# Patient Record
Sex: MALE | Race: WHITE | NOT HISPANIC OR LATINO | Employment: UNEMPLOYED | URBAN - METROPOLITAN AREA
[De-identification: names, ages, dates, MRNs, and addresses within clinical notes are randomized per-mention and may not be internally consistent; named-entity substitution may affect disease eponyms.]

---

## 2023-09-06 ENCOUNTER — OFFICE VISIT (OUTPATIENT)
Dept: FAMILY MEDICINE | Facility: CLINIC | Age: 33
End: 2023-09-06
Payer: COMMERCIAL

## 2023-09-06 VITALS
BODY MASS INDEX: 27.57 KG/M2 | DIASTOLIC BLOOD PRESSURE: 78 MMHG | RESPIRATION RATE: 18 BRPM | HEART RATE: 108 BPM | TEMPERATURE: 98 F | SYSTOLIC BLOOD PRESSURE: 126 MMHG | WEIGHT: 192.56 LBS | OXYGEN SATURATION: 96 % | HEIGHT: 70 IN

## 2023-09-06 DIAGNOSIS — K42.9 UMBILICAL HERNIA WITHOUT OBSTRUCTION AND WITHOUT GANGRENE: Primary | ICD-10-CM

## 2023-09-06 PROCEDURE — 1159F MED LIST DOCD IN RCRD: CPT | Mod: CPTII,S$GLB,, | Performed by: INTERNAL MEDICINE

## 2023-09-06 PROCEDURE — 99999 PR PBB SHADOW E&M-EST. PATIENT-LVL IV: ICD-10-PCS | Mod: PBBFAC,,, | Performed by: INTERNAL MEDICINE

## 2023-09-06 PROCEDURE — 99202 OFFICE O/P NEW SF 15 MIN: CPT | Mod: S$GLB,,, | Performed by: INTERNAL MEDICINE

## 2023-09-06 PROCEDURE — 1160F RVW MEDS BY RX/DR IN RCRD: CPT | Mod: CPTII,S$GLB,, | Performed by: INTERNAL MEDICINE

## 2023-09-06 PROCEDURE — 99202 PR OFFICE/OUTPT VISIT, NEW, LEVL II, 15-29 MIN: ICD-10-PCS | Mod: S$GLB,,, | Performed by: INTERNAL MEDICINE

## 2023-09-06 PROCEDURE — 3078F PR MOST RECENT DIASTOLIC BLOOD PRESSURE < 80 MM HG: ICD-10-PCS | Mod: CPTII,S$GLB,, | Performed by: INTERNAL MEDICINE

## 2023-09-06 PROCEDURE — 3078F DIAST BP <80 MM HG: CPT | Mod: CPTII,S$GLB,, | Performed by: INTERNAL MEDICINE

## 2023-09-06 PROCEDURE — 99999 PR PBB SHADOW E&M-EST. PATIENT-LVL IV: CPT | Mod: PBBFAC,,, | Performed by: INTERNAL MEDICINE

## 2023-09-06 PROCEDURE — 1160F PR REVIEW ALL MEDS BY PRESCRIBER/CLIN PHARMACIST DOCUMENTED: ICD-10-PCS | Mod: CPTII,S$GLB,, | Performed by: INTERNAL MEDICINE

## 2023-09-06 PROCEDURE — 3074F PR MOST RECENT SYSTOLIC BLOOD PRESSURE < 130 MM HG: ICD-10-PCS | Mod: CPTII,S$GLB,, | Performed by: INTERNAL MEDICINE

## 2023-09-06 PROCEDURE — 3074F SYST BP LT 130 MM HG: CPT | Mod: CPTII,S$GLB,, | Performed by: INTERNAL MEDICINE

## 2023-09-06 PROCEDURE — 3008F PR BODY MASS INDEX (BMI) DOCUMENTED: ICD-10-PCS | Mod: CPTII,S$GLB,, | Performed by: INTERNAL MEDICINE

## 2023-09-06 PROCEDURE — 1159F PR MEDICATION LIST DOCUMENTED IN MEDICAL RECORD: ICD-10-PCS | Mod: CPTII,S$GLB,, | Performed by: INTERNAL MEDICINE

## 2023-09-06 PROCEDURE — 3008F BODY MASS INDEX DOCD: CPT | Mod: CPTII,S$GLB,, | Performed by: INTERNAL MEDICINE

## 2023-09-06 RX ORDER — HYDROGEN PEROXIDE 3 %
20 SOLUTION, NON-ORAL MISCELLANEOUS
COMMUNITY

## 2023-09-06 NOTE — PROGRESS NOTES
Subjective     Patient ID: Vladislav Shahid is a 32 y.o. male.    Chief Complaint: Follow-up and Hernia      HPI  Patient presents with complaints of a bump on abdomen. He reports last week he felt a pain just above the umbilicus. On Saturday, he noticed a bump. He reduced the bump and had some pain that resolved. He has had no pain since.    Review of Systems   Constitutional:  Negative for chills and fatigue.   Respiratory:  Negative for chest tightness and shortness of breath.    Cardiovascular:  Negative for chest pain and palpitations.   Gastrointestinal:  Negative for abdominal pain, constipation, diarrhea, nausea and vomiting.        Bump above umbilicus   Genitourinary:  Negative for frequency and urgency.   Neurological:  Negative for dizziness, numbness and headaches.          Objective     Physical Exam  Constitutional:       General: He is not in acute distress.     Appearance: Normal appearance. He is normal weight.   HENT:      Head: Normocephalic and atraumatic.   Cardiovascular:      Rate and Rhythm: Normal rate and regular rhythm.      Heart sounds: Normal heart sounds. No murmur heard.     No friction rub. No gallop.   Pulmonary:      Effort: Pulmonary effort is normal.      Breath sounds: Normal breath sounds. No wheezing, rhonchi or rales.   Abdominal:      General: Bowel sounds are normal. There is no distension.      Palpations: Abdomen is soft.      Tenderness: There is no abdominal tenderness. There is no rebound.      Comments: Hard tissue above umbilicus in midline, no palpable wall defect   Skin:     General: Skin is warm and dry.      Coloration: Skin is not jaundiced.   Neurological:      General: No focal deficit present.      Mental Status: He is alert and oriented to person, place, and time. Mental status is at baseline.   Psychiatric:         Mood and Affect: Mood normal.         Behavior: Behavior normal.            Assessment and Plan     1. Umbilical hernia without obstruction and  without gangrene  Comments:  Will obtain ultrasound. Advised patient to avoid activities that increase abdominal pressure  Orders:  -     US Abdomen Complete; Future; Expected date: 09/06/2023               Follow up in about 6 months (around 3/6/2024) for physical exam.

## 2023-09-15 ENCOUNTER — HOSPITAL ENCOUNTER (OUTPATIENT)
Dept: RADIOLOGY | Facility: HOSPITAL | Age: 33
Discharge: HOME | End: 2023-09-15
Attending: INTERNAL MEDICINE
Payer: COMMERCIAL

## 2023-09-15 DIAGNOSIS — K42.9 UMBILICAL HERNIA WITHOUT OBSTRUCTION AND WITHOUT GANGRENE: ICD-10-CM

## 2023-09-15 PROCEDURE — 76705 ECHO EXAM OF ABDOMEN: CPT | Mod: TC

## 2023-09-15 PROCEDURE — 76705 US ABDOMEN LIMITED_HERNIA: ICD-10-PCS | Mod: 26,,, | Performed by: RADIOLOGY

## 2023-09-15 PROCEDURE — 76705 ECHO EXAM OF ABDOMEN: CPT | Mod: 26,,, | Performed by: RADIOLOGY

## 2024-09-25 ENCOUNTER — OFFICE VISIT (OUTPATIENT)
Dept: FAMILY MEDICINE | Facility: CLINIC | Age: 34
End: 2024-09-25
Payer: COMMERCIAL

## 2024-09-25 ENCOUNTER — LAB VISIT (OUTPATIENT)
Dept: LAB | Facility: HOSPITAL | Age: 34
End: 2024-09-25
Attending: INTERNAL MEDICINE
Payer: COMMERCIAL

## 2024-09-25 VITALS
BODY MASS INDEX: 27.25 KG/M2 | HEIGHT: 70 IN | TEMPERATURE: 99 F | OXYGEN SATURATION: 95 % | SYSTOLIC BLOOD PRESSURE: 122 MMHG | HEART RATE: 93 BPM | DIASTOLIC BLOOD PRESSURE: 76 MMHG | WEIGHT: 190.38 LBS | RESPIRATION RATE: 17 BRPM

## 2024-09-25 DIAGNOSIS — K21.9 GASTROESOPHAGEAL REFLUX DISEASE WITHOUT ESOPHAGITIS: ICD-10-CM

## 2024-09-25 DIAGNOSIS — Z00.00 ANNUAL PHYSICAL EXAM: ICD-10-CM

## 2024-09-25 DIAGNOSIS — K42.9 UMBILICAL HERNIA WITHOUT OBSTRUCTION AND WITHOUT GANGRENE: ICD-10-CM

## 2024-09-25 DIAGNOSIS — Z00.00 ANNUAL PHYSICAL EXAM: Primary | ICD-10-CM

## 2024-09-25 LAB
ALBUMIN SERPL BCP-MCNC: 4.4 G/DL (ref 3.5–5.2)
ALP SERPL-CCNC: 72 U/L (ref 55–135)
ALT SERPL W/O P-5'-P-CCNC: 41 U/L (ref 10–44)
ANION GAP SERPL CALC-SCNC: 10 MMOL/L (ref 8–16)
AST SERPL-CCNC: 24 U/L (ref 10–40)
BASOPHILS # BLD AUTO: 0.07 K/UL (ref 0–0.2)
BASOPHILS NFR BLD: 0.7 % (ref 0–1.9)
BILIRUB SERPL-MCNC: 0.3 MG/DL (ref 0.1–1)
BUN SERPL-MCNC: 11 MG/DL (ref 6–20)
CALCIUM SERPL-MCNC: 9.9 MG/DL (ref 8.7–10.5)
CHLORIDE SERPL-SCNC: 105 MMOL/L (ref 95–110)
CHOLEST SERPL-MCNC: 190 MG/DL (ref 120–199)
CHOLEST/HDLC SERPL: 3.7 {RATIO} (ref 2–5)
CO2 SERPL-SCNC: 25 MMOL/L (ref 23–29)
CREAT SERPL-MCNC: 1.2 MG/DL (ref 0.5–1.4)
DIFFERENTIAL METHOD BLD: ABNORMAL
EOSINOPHIL # BLD AUTO: 0.1 K/UL (ref 0–0.5)
EOSINOPHIL NFR BLD: 1.3 % (ref 0–8)
ERYTHROCYTE [DISTWIDTH] IN BLOOD BY AUTOMATED COUNT: 12.9 % (ref 11.5–14.5)
EST. GFR  (NO RACE VARIABLE): >60 ML/MIN/1.73 M^2
ESTIMATED AVG GLUCOSE: 94 MG/DL (ref 68–131)
GLUCOSE SERPL-MCNC: 88 MG/DL (ref 70–110)
HBA1C MFR BLD: 4.9 % (ref 4–5.6)
HCT VFR BLD AUTO: 50.3 % (ref 40–54)
HDLC SERPL-MCNC: 51 MG/DL (ref 40–75)
HDLC SERPL: 26.8 % (ref 20–50)
HGB BLD-MCNC: 17 G/DL (ref 14–18)
IMM GRANULOCYTES # BLD AUTO: 0.09 K/UL (ref 0–0.04)
IMM GRANULOCYTES NFR BLD AUTO: 0.9 % (ref 0–0.5)
LDLC SERPL CALC-MCNC: 119.2 MG/DL (ref 63–159)
LYMPHOCYTES # BLD AUTO: 3.2 K/UL (ref 1–4.8)
LYMPHOCYTES NFR BLD: 31.6 % (ref 18–48)
MCH RBC QN AUTO: 29.8 PG (ref 27–31)
MCHC RBC AUTO-ENTMCNC: 33.8 G/DL (ref 32–36)
MCV RBC AUTO: 88 FL (ref 82–98)
MONOCYTES # BLD AUTO: 0.9 K/UL (ref 0.3–1)
MONOCYTES NFR BLD: 9.4 % (ref 4–15)
NEUTROPHILS # BLD AUTO: 5.6 K/UL (ref 1.8–7.7)
NEUTROPHILS NFR BLD: 56.1 % (ref 38–73)
NONHDLC SERPL-MCNC: 139 MG/DL
NRBC BLD-RTO: 0 /100 WBC
PLATELET # BLD AUTO: 306 K/UL (ref 150–450)
PMV BLD AUTO: 10.5 FL (ref 9.2–12.9)
POTASSIUM SERPL-SCNC: 4 MMOL/L (ref 3.5–5.1)
PROT SERPL-MCNC: 7.6 G/DL (ref 6–8.4)
RBC # BLD AUTO: 5.7 M/UL (ref 4.6–6.2)
SODIUM SERPL-SCNC: 140 MMOL/L (ref 136–145)
TRIGL SERPL-MCNC: 99 MG/DL (ref 30–150)
WBC # BLD AUTO: 10.03 K/UL (ref 3.9–12.7)

## 2024-09-25 PROCEDURE — 3078F DIAST BP <80 MM HG: CPT | Mod: CPTII,S$GLB,, | Performed by: INTERNAL MEDICINE

## 2024-09-25 PROCEDURE — 80061 LIPID PANEL: CPT | Performed by: INTERNAL MEDICINE

## 2024-09-25 PROCEDURE — 83036 HEMOGLOBIN GLYCOSYLATED A1C: CPT | Performed by: INTERNAL MEDICINE

## 2024-09-25 PROCEDURE — 99395 PREV VISIT EST AGE 18-39: CPT | Mod: S$GLB,,, | Performed by: INTERNAL MEDICINE

## 2024-09-25 PROCEDURE — 84443 ASSAY THYROID STIM HORMONE: CPT | Performed by: INTERNAL MEDICINE

## 2024-09-25 PROCEDURE — 80053 COMPREHEN METABOLIC PANEL: CPT | Performed by: INTERNAL MEDICINE

## 2024-09-25 PROCEDURE — 3008F BODY MASS INDEX DOCD: CPT | Mod: CPTII,S$GLB,, | Performed by: INTERNAL MEDICINE

## 2024-09-25 PROCEDURE — 36415 COLL VENOUS BLD VENIPUNCTURE: CPT | Mod: PO | Performed by: INTERNAL MEDICINE

## 2024-09-25 PROCEDURE — 85025 COMPLETE CBC W/AUTO DIFF WBC: CPT | Performed by: INTERNAL MEDICINE

## 2024-09-25 PROCEDURE — 99999 PR PBB SHADOW E&M-EST. PATIENT-LVL III: CPT | Mod: PBBFAC,,, | Performed by: INTERNAL MEDICINE

## 2024-09-25 PROCEDURE — 3074F SYST BP LT 130 MM HG: CPT | Mod: CPTII,S$GLB,, | Performed by: INTERNAL MEDICINE

## 2024-09-25 PROCEDURE — 1159F MED LIST DOCD IN RCRD: CPT | Mod: CPTII,S$GLB,, | Performed by: INTERNAL MEDICINE

## 2024-09-25 NOTE — PROGRESS NOTES
Patient ID: Vladislav Shahid is a 33 y.o. male.    Chief Complaint: Cough (A week or so which possibly caused hernia ), Hernia (Hurts when coughing ), and Annual Exam      History of Present Illness    - The patient presents for an annual wellness visit and to discuss a hernia and persistent dry cough.    Mr. Shahid reports having a hernia that he would like to have surgically repaired at the beginning of next year. He is seeking information about the process and steps required for this procedure.    The patient describes a persistent dry cough that has been present for years. He feels a sensation of excess mucus or a dry area in his throat, causing a constant need to clear his throat. He previously sought medical attention for this issue, and it was suggested that it might be due to nasal drip.    The patient was previously taking Nexium for this condition but discontinued use 2 months ago. He did not notice a difference in symptoms while taking the medication. The patient is considering restarting the medication to see if it helps with the cough.      ROS:  General: denies fever, denies chills, denies fatigue, denies weight gain, denies weight loss  Eyes: denies vision changes, denies redness, denies discharge  ENT: denies ear pain, denies nasal congestion, denies sore throat  Cardiovascular: denies chest pain, denies palpitations, denies lower extremity edema  Respiratory: complains of cough, denies shortness of breath  Gastrointestinal: denies abdominal pain, denies nausea, denies vomiting, denies diarrhea, denies constipation, denies blood in stool  Genitourinary: denies dysuria, denies hematuria, denies frequency  Musculoskeletal: denies joint pain, denies muscle pain  Skin: denies rash, denies lesion  Neurological: denies headache, denies dizziness, denies numbness, denies tingling  Psychiatric: denies anxiety, denies depression, denies sleep difficulty            Physical Exam    General: In no acute  "distress.  Head: Normocephalic. Non traumatic.  Eyes: PERRLA. EOMs full. Conjunctivae clear. Fundi grossly normal.  Ears: EACs clear. TMs normal.  Nose: Mucosa pink. Mucosa moist. No obstruction.  Throat: Clear. No exudates. No lesions.  Neck: Supple. No masses. No thyromegaly. No bruits.  Chest: Lungs clear. No rales. No rhonchi. No wheezes.  Heart: RRR. No murmurs. No rubs. No gallops.  Abdomen: Soft. No tenderness. No masses. BS normal.  : Normal external genitalia. No lesions. No discharge. No hernias  noted.  Back: Normal curvature. No scoliosis. No tenderness.  Extremities: Warm. Well perfused. No upper extremity edema. No lower extremity edema. FROM. No deformities. No joint erythema.  Neuro: No focal deficits appreciated. Good muscle tone. Normal response to visual stimuli. Normal response to auditory stimuli.  Skin: Normal. No rashes. No lesions noted.            Current Outpatient Medications:     esomeprazole (NEXIUM) 20 MG capsule, Take 20 mg by mouth., Disp: , Rfl:             VITAL SIGNS:  Vitals:    09/25/24 1552   BP: 122/76   BP Location: Left arm   Patient Position: Sitting   BP Method: Medium (Manual)   Pulse: 93   Resp: 17   Temp: 98.9 °F (37.2 °C)   TempSrc: Temporal   SpO2: 95%   Weight: 86.4 kg (190 lb 5.9 oz)   Height: 5' 10" (1.778 m)       CURRENT BMI:   Body mass index is 27.31 kg/m².    LABS REVIEWED:    CBC:  No results found for: "WBC", "RBC", "HGB", "HCT", "MCV", "MCH", "MCHC", "RDW", "PLT", "MPV", "GRAN", "LYMPH", "MONO", "EOS", "BASO", "EOSINOPHIL", "BASOPHIL"    CHEMISTRY:  No results found for: "NA", "K", "CL", "CO2", "GLU", "BUN", "CREATININE", "CALCIUM", "PROT", "ALBUMIN", "BILITOT", "ALKPHOS", "AST", "ALT", "ANIONGAP", "EGFRNORACEVR"    LIPID PANEL:  No results found for: "CHOL"  No results found for: "TRIG"  No results found for: "HDL"  No results found for: "LDLCALC"    THYROID:  No results found for: "TSH", "L1ERVZP", "T9WQMDR", "THYROIDAB", "FREET4"    DIABETES:  No " "results found for: "LABA1C", "HGBA1C"  No results found for: "MICALBCREAT"    Assessment and Plan     Assessment & Plan    CHRONIC COUGH AND THROAT IRRITATION:  - Considered acid reflux as potential cause for patient's chronic dry cough and throat irritation.  - Recommend trial of PPI therapy 2 times daily to fully address potential acid reflux and evaluate its effectiveness in resolving symptoms.  - Explained that acid reflux is a common cause of chronic cough and throat irritation.  - Discussed that PPIs (proton pump inhibitors) like Nexium and Prilosec work similarly to reduce stomach acid.  - Restarted PPI (Nexium or Prilosec) 2 times daily for potential acid reflux.    LABS:  - Ordered labs for annual physical.    HERNIA:  - Referred patient to general surgery for hernia consultation.          1. Annual physical exam  Comments:  Will obtain CBC, CMP, TSH, A1c and lipid panel for regular health monitoring  Orders:  -     CBC Auto Differential; Future; Expected date: 09/25/2024  -     Comprehensive Metabolic Panel; Future; Expected date: 09/25/2024  -     Lipid Panel; Future; Expected date: 09/25/2024  -     Hemoglobin A1C; Future; Expected date: 09/25/2024  -     TSH; Future; Expected date: 09/25/2024    2. Umbilical hernia without obstruction and without gangrene  Comments:  Will refer to general surgery  Orders:  -     Ambulatory referral/consult to General Surgery; Future; Expected date: 10/02/2024    3. Gastroesophageal reflux disease without esophagitis  Comments:  Recommend taking Nexium twice daily           No follow-ups on file.      This note was generated with the assistance of ambient listening technology. Verbal consent was obtained by the patient and accompanying visitor(s) for the recording of patient appointment to facilitate this note. I attest to having reviewed and edited the generated note for accuracy, though some syntax or spelling errors may persist. Please contact the author of this note " for any clarification.

## 2024-09-26 LAB — TSH SERPL DL<=0.005 MIU/L-ACNC: 1.6 UIU/ML (ref 0.4–4)

## 2024-09-27 ENCOUNTER — TELEPHONE (OUTPATIENT)
Dept: SURGERY | Facility: CLINIC | Age: 34
End: 2024-09-27
Payer: COMMERCIAL

## 2024-09-27 NOTE — TELEPHONE ENCOUNTER
Contacting pt back to schedule appt. We scheduled him for 9/30/24 at 2:45pm.          ----- Message from Abe Gaitan sent at 9/26/2024  5:07 PM CDT -----  Type:  Appointment Request         Name of Caller:pt  When is the first available appointment?No access  Symptoms:Umbilical hernia without obstruction and without gangrene [K42.9]  Would the patient rather a call back or a response via [facility]? call  Best Call Back Number:[phone]

## 2024-09-30 ENCOUNTER — OFFICE VISIT (OUTPATIENT)
Dept: SURGERY | Facility: CLINIC | Age: 34
End: 2024-09-30
Payer: COMMERCIAL

## 2024-09-30 VITALS
HEART RATE: 72 BPM | BODY MASS INDEX: 27.52 KG/M2 | SYSTOLIC BLOOD PRESSURE: 141 MMHG | WEIGHT: 191.81 LBS | DIASTOLIC BLOOD PRESSURE: 88 MMHG

## 2024-09-30 DIAGNOSIS — K42.9 UMBILICAL HERNIA WITHOUT OBSTRUCTION AND WITHOUT GANGRENE: Primary | ICD-10-CM

## 2024-09-30 PROCEDURE — 3079F DIAST BP 80-89 MM HG: CPT | Mod: CPTII,S$GLB,,

## 2024-09-30 PROCEDURE — 1160F RVW MEDS BY RX/DR IN RCRD: CPT | Mod: CPTII,S$GLB,,

## 2024-09-30 PROCEDURE — 3077F SYST BP >= 140 MM HG: CPT | Mod: CPTII,S$GLB,,

## 2024-09-30 PROCEDURE — 3008F BODY MASS INDEX DOCD: CPT | Mod: CPTII,S$GLB,,

## 2024-09-30 PROCEDURE — 99999 PR PBB SHADOW E&M-EST. PATIENT-LVL III: CPT | Mod: PBBFAC,,,

## 2024-09-30 PROCEDURE — 3044F HG A1C LEVEL LT 7.0%: CPT | Mod: CPTII,S$GLB,,

## 2024-09-30 PROCEDURE — 1159F MED LIST DOCD IN RCRD: CPT | Mod: CPTII,S$GLB,,

## 2024-09-30 PROCEDURE — 99204 OFFICE O/P NEW MOD 45 MIN: CPT | Mod: S$GLB,,,

## 2024-09-30 NOTE — PROGRESS NOTES
History & Physical    Subjective     History of Present Illness:  Patient is a 33 y.o. male who presents for evaluation of a bulge superior to his umbilicus.  He first noticed the bulge around a year ago, and feels it has slightly enlarged since then.  It was generally not painful, although he did have some discomfort in the area last week.  He is able to partially push it back in.  He denies any fevers, chills, nausea, vomiting, or changes in bowel function.  He denies any past abdominal surgical history.  He does not have any significant pulmonary cardiac history.      Of note, he does complain of a chronic intermittent cough since he was a child.  He reports never undergoing any significant workup for this.  He has never seen a pulmonologist or ENT.  He denies any significant shortness of breath or chest pain.  The cough is not productive.  Denies any associated wheezing.  Has been placed on a steroid inhaler in the past without improvement.  He has not noticed improvement in his cough with regular Nexium use.  He reports that his maternal grandmother has a history of emphysema secondary to alpha-1 antitrypsin deficiency. He is not a regular smoker.     Chief Complaint   Patient presents with    Hernia     Umbilical hernia       Review of patient's allergies indicates:  No Known Allergies    Current Outpatient Medications   Medication Sig Dispense Refill    esomeprazole (NEXIUM) 20 MG capsule Take 20 mg by mouth.       No current facility-administered medications for this visit.       History reviewed. No pertinent past medical history.  History reviewed. No pertinent surgical history.  Family History   Problem Relation Name Age of Onset    Thyroid disease Mother      Diabetes Brother       Social History     Tobacco Use    Smoking status: Never        Review of Systems:  Review of Systems   Constitutional:  Negative for chills, fever and unexpected weight change.   HENT:  Negative for congestion.    Eyes:   Negative for visual disturbance.   Respiratory:  Positive for cough. Negative for shortness of breath.    Cardiovascular:  Negative for chest pain.   Gastrointestinal:  Negative for abdominal distention, abdominal pain, constipation, nausea, rectal pain and vomiting.        Bulge of umbilicus    Genitourinary:  Negative for dysuria.   Musculoskeletal:  Negative for arthralgias.   Skin:  Negative for rash.   Neurological:  Negative for light-headedness.   Hematological:  Negative for adenopathy.          Objective     Vital Signs (Most Recent)  Pulse: 72 (09/30/24 1455)  BP: (!) 141/88 (09/30/24 1455)     87 kg (191 lb 12.8 oz)     Physical Exam:  Physical Exam  Vitals reviewed.   Constitutional:       General: He is not in acute distress.     Appearance: He is well-developed. He is not toxic-appearing.   HENT:      Head: Normocephalic and atraumatic.      Right Ear: External ear normal.      Left Ear: External ear normal.      Nose: Nose normal.      Mouth/Throat:      Mouth: Mucous membranes are moist.   Eyes:      Extraocular Movements: Extraocular movements intact.      Conjunctiva/sclera: Conjunctivae normal.   Cardiovascular:      Rate and Rhythm: Normal rate.   Pulmonary:      Effort: Pulmonary effort is normal. No respiratory distress.      Breath sounds: Normal breath sounds. No stridor. No wheezing, rhonchi or rales.   Abdominal:      Comments: Soft, NT and ND. Fat-containing partially reducible supraumbilical hernia, tender at attempt of reduction   Musculoskeletal:      Cervical back: Normal range of motion and neck supple.   Skin:     General: Skin is warm and dry.   Neurological:      Mental Status: He is alert and oriented to person, place, and time.   Psychiatric:         Behavior: Behavior normal.         Laboratory  Lab Results   Component Value Date    WBC 10.03 09/25/2024    HGB 17.0 09/25/2024    HCT 50.3 09/25/2024    MCV 88 09/25/2024     09/25/2024       CMP  Sodium   Date Value Ref  Range Status   09/25/2024 140 136 - 145 mmol/L Final     Potassium   Date Value Ref Range Status   09/25/2024 4.0 3.5 - 5.1 mmol/L Final     Chloride   Date Value Ref Range Status   09/25/2024 105 95 - 110 mmol/L Final     CO2   Date Value Ref Range Status   09/25/2024 25 23 - 29 mmol/L Final     Glucose   Date Value Ref Range Status   09/25/2024 88 70 - 110 mg/dL Final     BUN   Date Value Ref Range Status   09/25/2024 11 6 - 20 mg/dL Final     Creatinine   Date Value Ref Range Status   09/25/2024 1.2 0.5 - 1.4 mg/dL Final     Calcium   Date Value Ref Range Status   09/25/2024 9.9 8.7 - 10.5 mg/dL Final     Total Protein   Date Value Ref Range Status   09/25/2024 7.6 6.0 - 8.4 g/dL Final     Albumin   Date Value Ref Range Status   09/25/2024 4.4 3.5 - 5.2 g/dL Final     Total Bilirubin   Date Value Ref Range Status   09/25/2024 0.3 0.1 - 1.0 mg/dL Final     Comment:     For infants and newborns, interpretation of results should be based  on gestational age, weight and in agreement with clinical  observations.    Premature Infant recommended reference ranges:  Up to 24 hours.............<8.0 mg/dL  Up to 48 hours............<12.0 mg/dL  3-5 days..................<15.0 mg/dL  6-29 days.................<15.0 mg/dL       Alkaline Phosphatase   Date Value Ref Range Status   09/25/2024 72 55 - 135 U/L Final     AST   Date Value Ref Range Status   09/25/2024 24 10 - 40 U/L Final     ALT   Date Value Ref Range Status   09/25/2024 41 10 - 44 U/L Final     Anion Gap   Date Value Ref Range Status   09/25/2024 10 8 - 16 mmol/L Final     eGFR   Date Value Ref Range Status   09/25/2024 >60.0 >60 mL/min/1.73 m^2 Final         Diagnostic Results:  U/S abdomen hernia  FINDINGS:  At the palpable area concern in the midline of the anterior abdominal wall superior to the umbilicus is a fat containing hernia with the herniated contents measuring 2.8 x 2.3 by 1.8 cm.  The mouth of the hernia measures 7 mm in diameter.     Impression:      Supraumbilical midline hernia containing only fat as detailed above.  No herniated bowel loops.       Assessment and Plan   33-year-old male with ventral hernia.      -discussed anatomy related to ventral hernias.  - discussed signs and symptoms of hernia incarceration and strangulation and gave ED precautions.  The patient voiced understanding.  - discussed robotic versus open ventral hernia repair.  Discussed repair primarily with suture versus mesh.  Discussed that repair with mesh has a much lower recurrence rate than without mesh.  Discussed types of mesh including permanent versus nonpermanent mesh.  Patient is interested in undergoing a repair with non permanent mesh after the 1st of the year.  -regarding chronic cough, recommend further discussion and workup with PCP and/or pulmonology. Offered referral, but patient declined at this time. He feels the cough may be somewhat related to dust. Advised he can try OTC anti-histamine in the interval and monitor for improvement, but ultimately should f/u with PCP and/or pulm.   - return to clinic next year for surgical planning or sooner if needed.     Sherlyn Tsang PA-C  [facility]      I spent a total of 45 minutes of the day on this visit. This includes face to face time and non-face to face time preparing to see the patient (eg, review of tests), obtaining and/or reviewing separately obtained history, documenting clinical information in the electronic or other health record, independently interpreting results and communicating results to the patient/family/caregiver, or care coordinator.

## 2024-11-05 ENCOUNTER — PATIENT MESSAGE (OUTPATIENT)
Dept: RESEARCH | Facility: HOSPITAL | Age: 34
End: 2024-11-05
Payer: COMMERCIAL

## 2025-01-27 ENCOUNTER — LAB VISIT (OUTPATIENT)
Dept: LAB | Facility: HOSPITAL | Age: 35
End: 2025-01-27
Payer: COMMERCIAL

## 2025-01-27 ENCOUNTER — OFFICE VISIT (OUTPATIENT)
Dept: SURGERY | Facility: CLINIC | Age: 35
End: 2025-01-27
Payer: COMMERCIAL

## 2025-01-27 VITALS
SYSTOLIC BLOOD PRESSURE: 139 MMHG | HEART RATE: 82 BPM | BODY MASS INDEX: 26.98 KG/M2 | DIASTOLIC BLOOD PRESSURE: 94 MMHG | WEIGHT: 188 LBS

## 2025-01-27 DIAGNOSIS — K42.9 UMBILICAL HERNIA WITHOUT OBSTRUCTION AND WITHOUT GANGRENE: Primary | ICD-10-CM

## 2025-01-27 DIAGNOSIS — K42.9 UMBILICAL HERNIA WITHOUT OBSTRUCTION AND WITHOUT GANGRENE: ICD-10-CM

## 2025-01-27 LAB
ALBUMIN SERPL BCP-MCNC: 4.6 G/DL (ref 3.5–5.2)
ALP SERPL-CCNC: 67 U/L (ref 40–150)
ALT SERPL W/O P-5'-P-CCNC: 40 U/L (ref 10–44)
ANION GAP SERPL CALC-SCNC: 11 MMOL/L (ref 8–16)
AST SERPL-CCNC: 21 U/L (ref 10–40)
BILIRUB SERPL-MCNC: 0.4 MG/DL (ref 0.1–1)
BUN SERPL-MCNC: 14 MG/DL (ref 6–20)
CALCIUM SERPL-MCNC: 9.8 MG/DL (ref 8.7–10.5)
CHLORIDE SERPL-SCNC: 104 MMOL/L (ref 95–110)
CO2 SERPL-SCNC: 24 MMOL/L (ref 23–29)
CREAT SERPL-MCNC: 1.1 MG/DL (ref 0.5–1.4)
EST. GFR  (NO RACE VARIABLE): >60 ML/MIN/1.73 M^2
GLUCOSE SERPL-MCNC: 93 MG/DL (ref 70–110)
POTASSIUM SERPL-SCNC: 4.3 MMOL/L (ref 3.5–5.1)
PROT SERPL-MCNC: 7.9 G/DL (ref 6–8.4)
SODIUM SERPL-SCNC: 139 MMOL/L (ref 136–145)

## 2025-01-27 PROCEDURE — 36415 COLL VENOUS BLD VENIPUNCTURE: CPT

## 2025-01-27 PROCEDURE — 3008F BODY MASS INDEX DOCD: CPT | Mod: CPTII,S$GLB,,

## 2025-01-27 PROCEDURE — 1160F RVW MEDS BY RX/DR IN RCRD: CPT | Mod: CPTII,S$GLB,,

## 2025-01-27 PROCEDURE — 1159F MED LIST DOCD IN RCRD: CPT | Mod: CPTII,S$GLB,,

## 2025-01-27 PROCEDURE — 99999 PR PBB SHADOW E&M-EST. PATIENT-LVL IV: CPT | Mod: PBBFAC,,,

## 2025-01-27 PROCEDURE — 3080F DIAST BP >= 90 MM HG: CPT | Mod: CPTII,S$GLB,,

## 2025-01-27 PROCEDURE — 80053 COMPREHEN METABOLIC PANEL: CPT

## 2025-01-27 PROCEDURE — 85025 COMPLETE CBC W/AUTO DIFF WBC: CPT

## 2025-01-27 PROCEDURE — 3075F SYST BP GE 130 - 139MM HG: CPT | Mod: CPTII,S$GLB,,

## 2025-01-27 PROCEDURE — 99214 OFFICE O/P EST MOD 30 MIN: CPT | Mod: S$GLB,,,

## 2025-01-27 RX ORDER — CEFAZOLIN SODIUM 2 G/50ML
2 SOLUTION INTRAVENOUS
OUTPATIENT
Start: 2025-01-27

## 2025-01-27 RX ORDER — SODIUM CHLORIDE, SODIUM LACTATE, POTASSIUM CHLORIDE, CALCIUM CHLORIDE 600; 310; 30; 20 MG/100ML; MG/100ML; MG/100ML; MG/100ML
INJECTION, SOLUTION INTRAVENOUS CONTINUOUS
OUTPATIENT
Start: 2025-01-27

## 2025-01-27 RX ORDER — ONDANSETRON HYDROCHLORIDE 2 MG/ML
4 INJECTION, SOLUTION INTRAVENOUS EVERY 12 HOURS PRN
OUTPATIENT
Start: 2025-01-27

## 2025-01-27 NOTE — PROGRESS NOTES
History & Physical    Subjective     History of Present Illness:  Patient is a 34 y.o. male who presents for evaluation of a bulge superior to his umbilicus.  He first noticed the bulge around a year ago, and feels it has slightly enlarged since then.  It was generally not painful, although he did have some discomfort in the area last week.  He is able to partially push it back in.  He denies any fevers, chills, nausea, vomiting, or changes in bowel function.  He denies any past abdominal surgical history.  He does not have any significant pulmonary cardiac history.      Of note, he does complain of a chronic intermittent cough since he was a child.  He reports never undergoing any significant workup for this.  He has never seen a pulmonologist or ENT.  He denies any significant shortness of breath or chest pain.  The cough is not productive.  Denies any associated wheezing.  Has been placed on a steroid inhaler in the past without improvement.  He has not noticed improvement in his cough with regular Nexium use.  He reports that his maternal grandmother has a history of emphysema secondary to alpha-1 antitrypsin deficiency. He is not a regular smoker.     Interval History:  There has been no significant changes since last clinic visit.  The patient would still like to pursue repair with non permanent mesh.  He still has an intermittent cough that he is not overly concerned with.  He is feeling well today.    Chief Complaint   Patient presents with    ventral hernia       Review of patient's allergies indicates:  No Known Allergies    Current Outpatient Medications   Medication Sig Dispense Refill    esomeprazole (NEXIUM) 20 MG capsule Take 20 mg by mouth.       No current facility-administered medications for this visit.       History reviewed. No pertinent past medical history.  History reviewed. No pertinent surgical history.  Family History   Problem Relation Name Age of Onset    Thyroid disease Mother       Diabetes Brother       Social History     Tobacco Use    Smoking status: Never        Review of Systems:  Review of Systems   Constitutional:  Negative for chills, fever and unexpected weight change.   HENT:  Negative for congestion.    Eyes:  Negative for visual disturbance.   Respiratory:  Positive for cough. Negative for shortness of breath.    Cardiovascular:  Negative for chest pain.   Gastrointestinal:  Negative for abdominal distention, abdominal pain, constipation, nausea, rectal pain and vomiting.        Bulge of umbilicus    Genitourinary:  Negative for dysuria.   Musculoskeletal:  Negative for arthralgias.   Skin:  Negative for rash.   Neurological:  Negative for light-headedness.   Hematological:  Negative for adenopathy.          Objective     Vital Signs (Most Recent)  Pulse: 82 (01/27/25 1428)  BP: (!) 139/94 (01/27/25 1428)     85.3 kg (188 lb)     Physical Exam:  Physical Exam  Vitals reviewed.   Constitutional:       General: He is not in acute distress.     Appearance: He is well-developed. He is not toxic-appearing.   HENT:      Head: Normocephalic and atraumatic.      Right Ear: External ear normal.      Left Ear: External ear normal.      Nose: Nose normal.      Mouth/Throat:      Mouth: Mucous membranes are moist.   Eyes:      Extraocular Movements: Extraocular movements intact.      Conjunctiva/sclera: Conjunctivae normal.   Cardiovascular:      Rate and Rhythm: Normal rate.   Pulmonary:      Effort: Pulmonary effort is normal. No respiratory distress.      Breath sounds: Normal breath sounds. No stridor. No wheezing, rhonchi or rales.   Abdominal:      Comments: Soft, NT and ND. Fat-containing partially reducible supraumbilical hernia, tender at attempt of reduction   Musculoskeletal:      Cervical back: Normal range of motion and neck supple.   Skin:     General: Skin is warm and dry.   Neurological:      Mental Status: He is alert and oriented to person, place, and time.   Psychiatric:          Behavior: Behavior normal.         Laboratory  Lab Results   Component Value Date    WBC 10.03 09/25/2024    HGB 17.0 09/25/2024    HCT 50.3 09/25/2024    MCV 88 09/25/2024     09/25/2024       CMP  Sodium   Date Value Ref Range Status   09/25/2024 140 136 - 145 mmol/L Final     Potassium   Date Value Ref Range Status   09/25/2024 4.0 3.5 - 5.1 mmol/L Final     Chloride   Date Value Ref Range Status   09/25/2024 105 95 - 110 mmol/L Final     CO2   Date Value Ref Range Status   09/25/2024 25 23 - 29 mmol/L Final     Glucose   Date Value Ref Range Status   09/25/2024 88 70 - 110 mg/dL Final     BUN   Date Value Ref Range Status   09/25/2024 11 6 - 20 mg/dL Final     Creatinine   Date Value Ref Range Status   09/25/2024 1.2 0.5 - 1.4 mg/dL Final     Calcium   Date Value Ref Range Status   09/25/2024 9.9 8.7 - 10.5 mg/dL Final     Total Protein   Date Value Ref Range Status   09/25/2024 7.6 6.0 - 8.4 g/dL Final     Albumin   Date Value Ref Range Status   09/25/2024 4.4 3.5 - 5.2 g/dL Final     Total Bilirubin   Date Value Ref Range Status   09/25/2024 0.3 0.1 - 1.0 mg/dL Final     Comment:     For infants and newborns, interpretation of results should be based  on gestational age, weight and in agreement with clinical  observations.    Premature Infant recommended reference ranges:  Up to 24 hours.............<8.0 mg/dL  Up to 48 hours............<12.0 mg/dL  3-5 days..................<15.0 mg/dL  6-29 days.................<15.0 mg/dL       Alkaline Phosphatase   Date Value Ref Range Status   09/25/2024 72 55 - 135 U/L Final     AST   Date Value Ref Range Status   09/25/2024 24 10 - 40 U/L Final     ALT   Date Value Ref Range Status   09/25/2024 41 10 - 44 U/L Final     Anion Gap   Date Value Ref Range Status   09/25/2024 10 8 - 16 mmol/L Final     eGFR   Date Value Ref Range Status   09/25/2024 >60.0 >60 mL/min/1.73 m^2 Final         Diagnostic Results:  U/S abdomen hernia  FINDINGS:  At the palpable area  concern in the midline of the anterior abdominal wall superior to the umbilicus is a fat containing hernia with the herniated contents measuring 2.8 x 2.3 by 1.8 cm.  The mouth of the hernia measures 7 mm in diameter.     Impression:     Supraumbilical midline hernia containing only fat as detailed above.  No herniated bowel loops.       Assessment and Plan   33-year-old male with ventral hernia.      -again discussed anatomy related to ventral hernias.  - discussed signs and symptoms of hernia incarceration and strangulation and gave ED precautions.  The patient voiced understanding.  - discussed open hernia repair with non permanent mesh.  Discussed risks and benefits including but not limited to pain, bleeding, infection, seroma, hematoma, hernia recurrence, numbness, damage to surrounding structures leading to need for further procedures.  The operative surgeon obtained informed consent.  All questions answered.  -preop: CBC and CMP.    -return to clinic postoperatively    Sherlyn Tsang PA-C  [facility]      I spent a total of 30 minutes of the day on this visit. This includes face to face time and non-face to face time preparing to see the patient (eg, review of tests), obtaining and/or reviewing separately obtained history, documenting clinical information in the electronic or other health record, independently interpreting results and communicating results to the patient/family/caregiver, or care coordinator.

## 2025-01-28 LAB
BASOPHILS # BLD AUTO: 0.04 K/UL (ref 0–0.2)
BASOPHILS NFR BLD: 0.5 % (ref 0–1.9)
DIFFERENTIAL METHOD BLD: ABNORMAL
EOSINOPHIL # BLD AUTO: 0.1 K/UL (ref 0–0.5)
EOSINOPHIL NFR BLD: 0.8 % (ref 0–8)
ERYTHROCYTE [DISTWIDTH] IN BLOOD BY AUTOMATED COUNT: 12.5 % (ref 11.5–14.5)
HCT VFR BLD AUTO: 51.7 % (ref 40–54)
HGB BLD-MCNC: 17.5 G/DL (ref 14–18)
IMM GRANULOCYTES # BLD AUTO: 0.05 K/UL (ref 0–0.04)
IMM GRANULOCYTES NFR BLD AUTO: 0.6 % (ref 0–0.5)
LYMPHOCYTES # BLD AUTO: 2.5 K/UL (ref 1–4.8)
LYMPHOCYTES NFR BLD: 29.6 % (ref 18–48)
MCH RBC QN AUTO: 30 PG (ref 27–31)
MCHC RBC AUTO-ENTMCNC: 33.8 G/DL (ref 32–36)
MCV RBC AUTO: 89 FL (ref 82–98)
MONOCYTES # BLD AUTO: 0.8 K/UL (ref 0.3–1)
MONOCYTES NFR BLD: 9 % (ref 4–15)
NEUTROPHILS # BLD AUTO: 5 K/UL (ref 1.8–7.7)
NEUTROPHILS NFR BLD: 59.5 % (ref 38–73)
NRBC BLD-RTO: 0 /100 WBC
PLATELET # BLD AUTO: 325 K/UL (ref 150–450)
PMV BLD AUTO: 10.1 FL (ref 9.2–12.9)
RBC # BLD AUTO: 5.84 M/UL (ref 4.6–6.2)
WBC # BLD AUTO: 8.35 K/UL (ref 3.9–12.7)

## 2025-02-20 ENCOUNTER — TELEPHONE (OUTPATIENT)
Dept: PREADMISSION TESTING | Facility: HOSPITAL | Age: 35
End: 2025-02-20
Payer: COMMERCIAL

## 2025-02-20 NOTE — TELEPHONE ENCOUNTER
Adult Annual Physical  Name: jK Mahmood      : 1967      MRN: [id number]  Encounter Provider: Dean Burton PA-C  Encounter Date: 10/31/2024   Encounter department: [facility] INTERNAL MEDICINE [place]    Pre op instructions reviewed with Pt over telephone, verbalized understanding.    Procedure Date: 2/21/25  Arrival Time:  PLEASE ARRIVE AT 5:30 AM  Address:   [facility] ([facility], 2nd Building on the left)  [address] [place] [address] [place]. [address]  >>>Please enter through front entrance Lobby of 1st floor to Registration desk<<<      !!!INSTRUCTIONS IMPORTANT!!!  NO FOOD or tobacco products after midnight the night before surgery! You may have clear liquids up to 3 hrs before your arrival to the Hospital  Clear liquids include Gatorade, water, soda, black coffee or tea (no milk or creamer), and clear juices.  Clear liquids do NOT include anything with pulp or food particles (Chicken broth, ice cream, yogurt, Jello, etc.)    >>>MEDICATION INSTRUCTIONS<<<: Morning of Surgery, take small sip of water with ONLY these medications:  NONE    *ADHD Medication: Stop taking ADHD/ADD medications 48 hrs prior to surgery, as this can affect the anesthesia used. Bariatric surgeries must HOLD 7 Days prior to surgery!    *Diabetic/ Prediabetic Patients: !!!If you take diabetic or weight loss medication, Do NOT take morning of surgery unless instructed by Doctor!!!  Metformin to be stopped 24 hrs prior to surgery.   Long Acting Insulin Instructions: HOLD the night before surgery unless instructed differently by Provider!  Ozempic/ Mounjaro/ Wegovy/ Trulicity/ Semaglutide injections or weight loss medication to be stopped 7 days prior to surgery.    !!!STOP ALL Aspirins, NSAIDS, WEIGHT LOSS INJECTIONS/PILLS, Herbal supplements, & Vitamins 7 DAYS BEFORE SURGERY!!!    ____  Avoid Alcoholic beverages 3 days prior to surgery, as it can thin the blood.  ____  NO Acrylic/fake nails or nail polish worn day of surgery (specifically hand/arm & foot surgeries).  ____  NO powder, lotions, deodorants, oils or cream on body.  ____  Remove all jewelry & piercings & foreign objects before arrival & leave at home.  ____  Remove    Dentures, Hearing Aids & Contact Lens prior to surgery.  ____  Bring photo ID and insurance information to hospital (Leave Valuables at Home).  ____  If going home the same day, arrange for a ride home. You will not be able to drive for 24 hrs if Anesthesia was used.   ____  Females (ages 11-60): may need to give a urine sample the morning of surgery; please see Pre op Nurse prior to using the restroom.  ____  Males: Stop ED medications (Viagra, Cialis) 24 hrs prior to surgery.  ____  Wear clean, loose fitting clothing to allow for dressings/ bandages.      Bathing Instructions:    -Shower with anti-bacterial Soap (Hibiclens or Dial) the night before surgery and the morning of.   -Do not use Hibiclens on your face or genitals.   -Apply clean clothes after shower.  -Do not shave your face or body 2 days prior to surgery unless instructed otherwise by your Surgeon.  -Do not shave pubic hair 7 days prior to surgery (gyn pt's).    [facility] Visitor/Ride Policy:  Only 2 adults allowed in pre op/recovery area during your procedure. You MUST HAVE A RIDE HOME from a responsible adult that you know and trust. Medical Transport, Uber or Lyft can ONLY be used if patient has a responsible adult to accompany them during ride home.       *Signs and symptoms of Infection Before or After Surgery:               !!!If you experience any fever, chills, nausea/ vomiting, foul odor/ excessive drainage from surgical site, flu-like symptoms, new wounds or cuts, PLEASE CALL THE SURGEON OFFICE at [phone] or SEND MESSAGE THROUGH [employer] PORTAL!!!     *If you are running late the morning of surgery, please call the Hospital Surgery Dept @ [phone].     *Billing questions:  [phone]     Thank you,  -[facility] Pre Admit Dept.  [phone] or [phone]  M-F 7:30 am-4:00 pm (Closed Major Holidays)     were no vitals taken for this visit.    Physical Exam  Vitals and nursing note reviewed.   Constitutional:       General: He is not in acute distress.     Appearance: Normal appearance. He is well-developed. He is not ill-appearing.      Comments: Well-developed, well-nourished 57-year-old male appearing younger than stated age no acute distress.   HENT:      Head: Normocephalic and atraumatic.      Right Ear: Tympanic membrane, ear canal and external ear normal.      Left Ear: Tympanic membrane, ear canal and external ear normal.      Nose: Nose normal.      Comments: Pale boggy inferior nasal turbinates.  Increased clear to whitish nasal discharge.     Mouth/Throat:      Mouth: Mucous membranes are moist.      Pharynx: Oropharynx is clear. No oropharyngeal exudate or posterior oropharyngeal erythema.   Eyes:      Extraocular Movements: Extraocular movements intact.      Conjunctiva/sclera: Conjunctivae normal.      Pupils: Pupils are equal, round, and reactive to light.   Neck:      Thyroid: No thyromegaly.      Vascular: No carotid bruit or JVD.   Cardiovascular:      Rate and Rhythm: Normal rate and regular rhythm.      Heart sounds: No murmur heard.  Pulmonary:      Effort: Pulmonary effort is normal. No respiratory distress.      Breath sounds: Normal breath sounds.   Chest:      Chest wall: No tenderness.   Abdominal:      General: Abdomen is flat. Bowel sounds are normal. There is no distension.      Palpations: Abdomen is soft. There is no hepatomegaly, splenomegaly or mass.      Tenderness: There is no abdominal tenderness. There is no right CVA tenderness, left CVA tenderness, guarding or rebound.      Hernia: No hernia is present.   Genitourinary:     Comments: Un circumcised adult male. No lesions of the phallus, scrotum, or testes. No inguinal hernias.  Rectal exam: Normal rectal tone. Prostate normal size shape and consistency without dominant nodules. Hemoccult negative.  Musculoskeletal:          General: No swelling. Normal range of motion.      Cervical back: Normal range of motion and neck supple.      Right lower leg: No edema.      Left lower leg: No edema.   Lymphadenopathy:      Cervical: No cervical adenopathy.   Skin:     General: Skin is warm and dry.      Findings: No rash.   Neurological:      General: No focal deficit present.      Mental Status: He is alert and oriented to person, place, and time. Mental status is at baseline.      Cranial Nerves: No cranial nerve deficit.      Sensory: No sensory deficit.      Motor: No weakness.      Coordination: Coordination normal.      Gait: Gait normal.      Deep Tendon Reflexes: Reflexes normal.   Psychiatric:         Mood and Affect: Mood normal.         Behavior: Behavior normal.         Thought Content: Thought content normal.         Judgment: Judgment normal.

## 2025-02-21 ENCOUNTER — ANESTHESIA EVENT (OUTPATIENT)
Dept: SURGERY | Facility: HOSPITAL | Age: 35
End: 2025-02-21
Payer: COMMERCIAL

## 2025-02-21 ENCOUNTER — HOSPITAL ENCOUNTER (OUTPATIENT)
Facility: HOSPITAL | Age: 35
Discharge: HOME | End: 2025-02-21
Attending: SURGERY | Admitting: SURGERY
Payer: COMMERCIAL

## 2025-02-21 ENCOUNTER — ANESTHESIA (OUTPATIENT)
Dept: SURGERY | Facility: HOSPITAL | Age: 35
End: 2025-02-21
Payer: COMMERCIAL

## 2025-02-21 VITALS
HEIGHT: 70 IN | WEIGHT: 188.5 LBS | HEART RATE: 85 BPM | SYSTOLIC BLOOD PRESSURE: 122 MMHG | BODY MASS INDEX: 26.99 KG/M2 | DIASTOLIC BLOOD PRESSURE: 58 MMHG | TEMPERATURE: 98 F | RESPIRATION RATE: 13 BRPM | OXYGEN SATURATION: 97 %

## 2025-02-21 DIAGNOSIS — K42.9 UMBILICAL HERNIA WITHOUT OBSTRUCTION AND WITHOUT GANGRENE: ICD-10-CM

## 2025-02-21 PROCEDURE — 63600175 PHARM REV CODE 636 W HCPCS

## 2025-02-21 PROCEDURE — 25000003 PHARM REV CODE 250: Performed by: NURSE ANESTHETIST, CERTIFIED REGISTERED

## 2025-02-21 PROCEDURE — 36000707: Performed by: SURGERY

## 2025-02-21 PROCEDURE — 63600175 PHARM REV CODE 636 W HCPCS: Performed by: NURSE ANESTHETIST, CERTIFIED REGISTERED

## 2025-02-21 PROCEDURE — 37000009 HC ANESTHESIA EA ADD 15 MINS: Performed by: SURGERY

## 2025-02-21 PROCEDURE — 37000008 HC ANESTHESIA 1ST 15 MINUTES: Performed by: SURGERY

## 2025-02-21 PROCEDURE — 63600175 PHARM REV CODE 636 W HCPCS: Performed by: SURGERY

## 2025-02-21 PROCEDURE — 71000033 HC RECOVERY, INTIAL HOUR: Performed by: SURGERY

## 2025-02-21 PROCEDURE — 71000015 HC POSTOP RECOV 1ST HR: Performed by: SURGERY

## 2025-02-21 PROCEDURE — 49591 RPR AA HRN 1ST < 3 CM RDC: CPT | Mod: ,,, | Performed by: SURGERY

## 2025-02-21 PROCEDURE — 36000706: Performed by: SURGERY

## 2025-02-21 PROCEDURE — C1781 MESH (IMPLANTABLE): HCPCS | Performed by: SURGERY

## 2025-02-21 DEVICE — PHASIX ST MESH, 8 CM (3"), CIRCLE
Type: IMPLANTABLE DEVICE | Site: ABDOMEN | Status: FUNCTIONAL
Brand: PHASIX

## 2025-02-21 RX ORDER — CEFAZOLIN 2 G/1
2 INJECTION, POWDER, FOR SOLUTION INTRAMUSCULAR; INTRAVENOUS
Status: COMPLETED | OUTPATIENT
Start: 2025-02-21 | End: 2025-02-21

## 2025-02-21 RX ORDER — ONDANSETRON HYDROCHLORIDE 2 MG/ML
4 INJECTION, SOLUTION INTRAVENOUS ONCE AS NEEDED
Status: DISCONTINUED | OUTPATIENT
Start: 2025-02-21 | End: 2025-02-21 | Stop reason: HOSPADM

## 2025-02-21 RX ORDER — ONDANSETRON 8 MG/1
8 TABLET, ORALLY DISINTEGRATING ORAL EVERY 8 HOURS PRN
Status: CANCELLED | OUTPATIENT
Start: 2025-02-21

## 2025-02-21 RX ORDER — HYDROMORPHONE HYDROCHLORIDE 2 MG/ML
1 INJECTION, SOLUTION INTRAMUSCULAR; INTRAVENOUS; SUBCUTANEOUS EVERY 4 HOURS PRN
Refills: 0 | Status: CANCELLED | OUTPATIENT
Start: 2025-02-21

## 2025-02-21 RX ORDER — PROPOFOL 10 MG/ML
VIAL (ML) INTRAVENOUS
Status: DISCONTINUED | OUTPATIENT
Start: 2025-02-21 | End: 2025-02-21

## 2025-02-21 RX ORDER — OXYCODONE AND ACETAMINOPHEN 5; 325 MG/1; MG/1
1 TABLET ORAL EVERY 4 HOURS PRN
Refills: 0 | Status: DISCONTINUED | OUTPATIENT
Start: 2025-02-21 | End: 2025-02-21 | Stop reason: HOSPADM

## 2025-02-21 RX ORDER — SODIUM CHLORIDE, SODIUM LACTATE, POTASSIUM CHLORIDE, CALCIUM CHLORIDE 600; 310; 30; 20 MG/100ML; MG/100ML; MG/100ML; MG/100ML
INJECTION, SOLUTION INTRAVENOUS CONTINUOUS PRN
Status: DISCONTINUED | OUTPATIENT
Start: 2025-02-21 | End: 2025-02-21

## 2025-02-21 RX ORDER — SODIUM CHLORIDE, SODIUM LACTATE, POTASSIUM CHLORIDE, CALCIUM CHLORIDE 600; 310; 30; 20 MG/100ML; MG/100ML; MG/100ML; MG/100ML
INJECTION, SOLUTION INTRAVENOUS CONTINUOUS
Status: DISCONTINUED | OUTPATIENT
Start: 2025-02-21 | End: 2025-02-21 | Stop reason: HOSPADM

## 2025-02-21 RX ORDER — BUPIVACAINE HYDROCHLORIDE 2.5 MG/ML
INJECTION, SOLUTION EPIDURAL; INFILTRATION; INTRACAUDAL
Status: DISCONTINUED | OUTPATIENT
Start: 2025-02-21 | End: 2025-02-21 | Stop reason: HOSPADM

## 2025-02-21 RX ORDER — ROCURONIUM BROMIDE 10 MG/ML
INJECTION, SOLUTION INTRAVENOUS
Status: DISCONTINUED | OUTPATIENT
Start: 2025-02-21 | End: 2025-02-21

## 2025-02-21 RX ORDER — OXYCODONE AND ACETAMINOPHEN 7.5; 325 MG/1; MG/1
1 TABLET ORAL EVERY 4 HOURS PRN
Refills: 0 | Status: DISCONTINUED | OUTPATIENT
Start: 2025-02-21 | End: 2025-02-21 | Stop reason: HOSPADM

## 2025-02-21 RX ORDER — ONDANSETRON HYDROCHLORIDE 2 MG/ML
4 INJECTION, SOLUTION INTRAVENOUS DAILY PRN
Status: DISCONTINUED | OUTPATIENT
Start: 2025-02-21 | End: 2025-02-21 | Stop reason: HOSPADM

## 2025-02-21 RX ORDER — SUCCINYLCHOLINE CHLORIDE 20 MG/ML
INJECTION INTRAMUSCULAR; INTRAVENOUS
Status: DISCONTINUED | OUTPATIENT
Start: 2025-02-21 | End: 2025-02-21

## 2025-02-21 RX ORDER — OXYCODONE AND ACETAMINOPHEN 5; 325 MG/1; MG/1
1 TABLET ORAL EVERY 6 HOURS PRN
Qty: 15 EACH | Refills: 0 | Status: SHIPPED | OUTPATIENT
Start: 2025-02-21

## 2025-02-21 RX ORDER — ONDANSETRON HYDROCHLORIDE 2 MG/ML
4 INJECTION, SOLUTION INTRAVENOUS EVERY 12 HOURS PRN
Status: DISCONTINUED | OUTPATIENT
Start: 2025-02-21 | End: 2025-02-21 | Stop reason: HOSPADM

## 2025-02-21 RX ORDER — ONDANSETRON HYDROCHLORIDE 2 MG/ML
INJECTION, SOLUTION INTRAVENOUS
Status: DISCONTINUED | OUTPATIENT
Start: 2025-02-21 | End: 2025-02-21

## 2025-02-21 RX ORDER — ONDANSETRON HYDROCHLORIDE 8 MG/1
8 TABLET, FILM COATED ORAL EVERY 8 HOURS PRN
Qty: 20 TABLET | Refills: 0 | Status: SHIPPED | OUTPATIENT
Start: 2025-02-21

## 2025-02-21 RX ORDER — LIDOCAINE HYDROCHLORIDE 10 MG/ML
INJECTION, SOLUTION EPIDURAL; INFILTRATION; INTRACAUDAL; PERINEURAL
Status: DISCONTINUED | OUTPATIENT
Start: 2025-02-21 | End: 2025-02-21

## 2025-02-21 RX ORDER — FENTANYL CITRATE 50 UG/ML
INJECTION, SOLUTION INTRAMUSCULAR; INTRAVENOUS
Status: DISCONTINUED | OUTPATIENT
Start: 2025-02-21 | End: 2025-02-21

## 2025-02-21 RX ORDER — FENTANYL CITRATE 50 UG/ML
25 INJECTION, SOLUTION INTRAMUSCULAR; INTRAVENOUS EVERY 5 MIN PRN
Status: DISCONTINUED | OUTPATIENT
Start: 2025-02-21 | End: 2025-02-21 | Stop reason: HOSPADM

## 2025-02-21 RX ORDER — MIDAZOLAM HYDROCHLORIDE 1 MG/ML
INJECTION INTRAMUSCULAR; INTRAVENOUS
Status: DISCONTINUED | OUTPATIENT
Start: 2025-02-21 | End: 2025-02-21

## 2025-02-21 RX ORDER — HYDROMORPHONE HYDROCHLORIDE 2 MG/ML
0.2 INJECTION, SOLUTION INTRAMUSCULAR; INTRAVENOUS; SUBCUTANEOUS EVERY 5 MIN PRN
Status: DISCONTINUED | OUTPATIENT
Start: 2025-02-21 | End: 2025-02-21 | Stop reason: HOSPADM

## 2025-02-21 RX ORDER — OXYCODONE AND ACETAMINOPHEN 5; 325 MG/1; MG/1
1 TABLET ORAL
Status: DISCONTINUED | OUTPATIENT
Start: 2025-02-21 | End: 2025-02-21 | Stop reason: HOSPADM

## 2025-02-21 RX ADMIN — SODIUM CHLORIDE, SODIUM LACTATE, POTASSIUM CHLORIDE, AND CALCIUM CHLORIDE: 600; 310; 30; 20 INJECTION, SOLUTION INTRAVENOUS at 07:02

## 2025-02-21 RX ADMIN — ONDANSETRON 4 MG: 2 INJECTION INTRAMUSCULAR; INTRAVENOUS at 08:02

## 2025-02-21 RX ADMIN — SUCCINYLCHOLINE CHLORIDE 120 MG: 20 INJECTION, SOLUTION INTRAMUSCULAR; INTRAVENOUS; PARENTERAL at 07:02

## 2025-02-21 RX ADMIN — ROCURONIUM BROMIDE 25 MG: 10 SOLUTION INTRAVENOUS at 07:02

## 2025-02-21 RX ADMIN — FENTANYL CITRATE 100 MCG: 50 INJECTION, SOLUTION INTRAMUSCULAR; INTRAVENOUS at 07:02

## 2025-02-21 RX ADMIN — MIDAZOLAM HYDROCHLORIDE 2 MG: 1 INJECTION, SOLUTION INTRAMUSCULAR; INTRAVENOUS at 07:02

## 2025-02-21 RX ADMIN — ROCURONIUM BROMIDE 5 MG: 10 SOLUTION INTRAVENOUS at 07:02

## 2025-02-21 RX ADMIN — LIDOCAINE HYDROCHLORIDE 50 MG: 10 SOLUTION INTRAVENOUS at 07:02

## 2025-02-21 RX ADMIN — CEFAZOLIN 2 G: 2 INJECTION, POWDER, FOR SOLUTION INTRAMUSCULAR; INTRAVENOUS at 07:02

## 2025-02-21 RX ADMIN — PROPOFOL 150 MG: 10 INJECTION, EMULSION INTRAVENOUS at 07:02

## 2025-02-21 RX ADMIN — ROCURONIUM BROMIDE 10 MG: 10 SOLUTION INTRAVENOUS at 07:02

## 2025-02-21 RX ADMIN — SUGAMMADEX 200 MG: 100 INJECTION, SOLUTION INTRAVENOUS at 08:02

## 2025-02-21 NOTE — DISCHARGE INSTRUCTIONS
Please call for any fever, increase in pain, nausea or vomiting or redness or drainage from incision(s).    No lifting more than 20 pounds for 4 weeks    No driving if taking the pain medicine    May shower once you remove the tape and gauze.      Removed the tape and gauze from the abdomen on Monday morning     Removed the glue when it becomes loose, this usually takes 10-14 days.      You may want to take a stool softener or Glycolax or MiraLax while taking pain medicine to avoid constipation    If you become constipated from the pain medication you can use a double dose of Miralax or Glycolax, or milk of magnesia for severe constipation.    Our office phone numbers are  [phone] and     Our office fax  number is #[phone]\

## 2025-02-21 NOTE — ANESTHESIA POSTPROCEDURE EVALUATION
Anesthesia Post Evaluation    Patient: Vladislav Shahid    Procedure(s) Performed: Procedure(s) (LRB):  REPAIR, HERNIA, VENTRAL (N/A)    Final Anesthesia Type: general      Patient location during evaluation: PACU  Patient participation: Yes- Able to Participate  Level of consciousness: awake and alert, oriented and awake  Post-procedure vital signs: reviewed and stable  Pain management: adequate  Airway patency: patent    PONV status at discharge: No PONV  Anesthetic complications: no      Cardiovascular status: blood pressure returned to baseline  Respiratory status: unassisted  Hydration status: euvolemic  Follow-up not needed.              Vitals Value Taken Time   /57 02/21/25 08:45   Temp 36.6 °C (97.8 °F) 02/21/25 08:25   Pulse 83 02/21/25 08:57   Resp 6 02/21/25 08:57   SpO2 98 % 02/21/25 08:57   Vitals shown include unfiled device data.      No case tracking events are documented in the log.      Pain/Jamil Score: Pain Rating Prior to Med Admin: 0 (2/21/2025  6:40 AM)  Pain Rating Post Med Admin: 0 (2/21/2025  6:40 AM)  Jamil Score: 10 (2/21/2025  8:30 AM)

## 2025-02-21 NOTE — ANESTHESIA PREPROCEDURE EVALUATION
02/21/2025  Vladislav Shahid is a 34 y.o., male.      Pre-op Assessment    I have reviewed the Patient Summary Reports.     I have reviewed the Nursing Notes. I have reviewed the NPO Status.      Review of Systems  Anesthesia Hx:  No problems with previous Anesthesia                Hematology/Oncology:  Hematology Normal   Oncology Normal                                   Renal/:  Renal/ Normal                 Hepatic/GI:  Hepatic/GI Normal                    Neurological:  Neurology Normal                                      Endocrine:  Endocrine Normal            Dermatological:  Skin Normal    Psych:  Psychiatric Normal                There is no problem list on file for this patient.        Physical Exam  General: Well nourished, Cooperative, Alert and Oriented    Airway:  Mallampati: II / II  Mouth Opening: Normal  TM Distance: Normal  Tongue: Normal  Neck ROM: Normal ROM    Dental:  Intact        Anesthesia Plan  Type of Anesthesia, risks & benefits discussed:    Anesthesia Type: Gen ETT  Intra-op Monitoring Plan: Standard ASA Monitors  Post Op Pain Control Plan: multimodal analgesia  Induction:  IV  Airway Plan: Direct  Informed Consent: Informed consent signed with the Patient and all parties understand the risks and agree with anesthesia plan.  All questions answered.   ASA Score: 2  Day of Surgery Review of History & Physical: H&P Update referred to the surgeon/provider.I have interviewed and examined the patient. I have reviewed the patient's H&P dated: There are no significant changes. H&P completed by Anesthesiologist.    Ready For Surgery From Anesthesia Perspective.     .

## 2025-02-21 NOTE — ANESTHESIA PROCEDURE NOTES
Intubation    Date/Time: 2/21/2025 7:13 AM    Performed by: Vladislav Chapman CRNA  Authorized by: Usama Cruz MD    Intubation:     Induction:  Intravenous    Intubated:  Postinduction    Mask Ventilation:  Easy mask    Attempts:  1    Attempted By:  CRNA    Method of Intubation:  Direct    Blade:  Zuleika 3    Laryngeal View Grade: Grade IIA - cords partially seen      Difficult Airway Encountered?: No      Complications:  None    Airway Device:  Oral endotracheal tube    Airway Device Size:  7.5    Style/Cuff Inflation:  Cuffed (inflated to minimal occlusive pressure)    Inflation Amount (mL):  4    Tube secured:  23    Secured at:  The lips    Placement Verified By:  Capnometry    Complicating Factors:  None    Findings Post-Intubation:  BS equal bilateral and atraumatic/condition of teeth unchanged

## 2025-02-21 NOTE — TRANSFER OF CARE
"Anesthesia Transfer of Care Note    Patient: Vladislav Shahid    Procedure(s) Performed: Procedure(s) (LRB):  REPAIR, HERNIA, VENTRAL (N/A)    Patient location: PACU    Anesthesia Type: general    Transport from OR: Transported from OR on room air with adequate spontaneous ventilation    Post pain: adequate analgesia    Post assessment: no apparent anesthetic complications and tolerated procedure well    Post vital signs: stable    Level of consciousness: responds to stimulation    Nausea/Vomiting: no nausea/vomiting    Complications: none    Transfer of care protocol was followed      Last vitals: Visit Vitals  BP (!) 155/82 (BP Location: Left arm, Patient Position: Sitting)   Pulse 85   Temp 36.7 °C (98.1 °F) (Temporal)   Resp 16   Ht 5' 10" (1.778 m)   Wt 85.5 kg (188 lb 7.9 oz)   SpO2 99%   BMI 27.05 kg/m²     "

## 2025-02-21 NOTE — OP NOTE
[facility] - Surgery (Hospital)  Operative Note      Date of Procedure: 2/21/2025     Procedure: Procedure(s) (LRB):  REPAIR, HERNIA, VENTRAL (N/A)     Surgeons and Role:     * Chi Maddox MD - Primary    Assisting Surgeon: None    Pre-Operative Diagnosis: Umbilical hernia without obstruction and without gangrene [K42.9]    Post-Operative Diagnosis: Post-Op Diagnosis Codes:     * Umbilical hernia without obstruction and without gangrene [K42.9]    Anesthesia: General    Operative Findings (including complications, if any):     1 cm umbilical hernia with incarcerated preperitoneal fat    Description of Technical Procedures:       Patient was brought in the operating room placed on the operative table supine position.  General endotracheal anesthesia was induced.  IV antibiotics were administered.  Pneumatic compressions on the lower extremities.  The abdomen was clipped, prepped and draped in the standard fashion.      A curvilinear incision was made below the umbilicus.  Subcutaneous tissues were dissected to the level the fascia.  The umbilical stalk was encircled and  from the umbilical skin.      A hernia defect was found.  It was a 1 cm in size.      The hernia sac was excised at the level of the fascia.  The fascia was elevated the preperitoneal space was mobilized circumferentially for several cm.      Hemostasis was achieved.      The hernia was was repaired by in laying a 6 cm [place] of Phasix mesh.      The mesh was secured with 0 PDS sutures.      The fascia was closed with 0 Vicryl in a running fashion.      Marcaine was infiltrated.  The wound was irrigated.  Hemostasis was ensured.  The undersurface of the umbilicus was tacked to the fascia with 3-0 Vicryl in interrupted fashion.  The deep tissues were closed with 3-0 Vicryl interrupted fashion.  Deep dermis was closed with 3-0 Vicryl in interrupted fashion.  The skin was closed with 4-0 Monocryl in a subcuticular fashion.      Dermabond was  "placed.      Once the Dermabond had dried a 4 x 4 gauze was placed in the umbilicus and covered with a tape    Final instrument counts were correct.      Patient tolerated procedure well     Significant Surgical Tasks Conducted by the Assistant(s), if Applicable:  None    Estimated Blood Loss (EBL):  15 mL   IV fluids 500 mL   Marcaine 0.25% 30 mL           Implants:   Implant Name Type Inv. Item Serial No.  Lot No. LRB No. Used Action   Phasix ST Mesh, [place] 8 cm (3")    [facility] [id number] N/A 1 Implanted       Specimens:   Specimen (24h ago, onward)      None                    Condition: Stable    Disposition: PACU - hemodynamically stable.    Attestation: I performed the procedure.    Discharge Note    OUTCOME: Patient tolerated treatment/procedure well without complication and is now ready for discharge.    Open umbilical hernia repair with Phasix mesh    DISPOSITION: Home or Self Care    FINAL DIAGNOSIS:  Umbilical hernia without obstruction and without gangrene    FOLLOWUP: In clinic    DISCHARGE INSTRUCTIONS:    Discharge Procedure Orders   Diet general     Call MD for:  temperature >100.4     Call MD for:  persistent nausea and vomiting     Call MD for:  severe uncontrolled pain     Call MD for:  difficulty breathing, headache or visual disturbances     Call MD for:  redness, tenderness, or signs of infection (pain, swelling, redness, odor or green/yellow discharge around incision site)     Lifting restrictions   Order Comments: No lifting more than 20 lb 4 weeks     Remove dressing in 72 hours       "

## 2025-02-24 ENCOUNTER — PATIENT MESSAGE (OUTPATIENT)
Dept: SURGERY | Facility: CLINIC | Age: 35
End: 2025-02-24
Payer: COMMERCIAL

## 2025-03-10 ENCOUNTER — OFFICE VISIT (OUTPATIENT)
Dept: SURGERY | Facility: CLINIC | Age: 35
End: 2025-03-10
Payer: COMMERCIAL

## 2025-03-10 VITALS — WEIGHT: 187 LBS | BODY MASS INDEX: 26.83 KG/M2

## 2025-03-10 DIAGNOSIS — R05.3 CHRONIC COUGH: ICD-10-CM

## 2025-03-10 DIAGNOSIS — K42.9 UMBILICAL HERNIA WITHOUT OBSTRUCTION AND WITHOUT GANGRENE: Primary | ICD-10-CM

## 2025-03-10 PROCEDURE — 1160F RVW MEDS BY RX/DR IN RCRD: CPT | Mod: CPTII,S$GLB,,

## 2025-03-10 PROCEDURE — 99999 PR PBB SHADOW E&M-EST. PATIENT-LVL III: CPT | Mod: PBBFAC,,,

## 2025-03-10 PROCEDURE — 3008F BODY MASS INDEX DOCD: CPT | Mod: CPTII,S$GLB,,

## 2025-03-10 PROCEDURE — 1159F MED LIST DOCD IN RCRD: CPT | Mod: CPTII,S$GLB,,

## 2025-03-10 PROCEDURE — 99212 OFFICE O/P EST SF 10 MIN: CPT | Mod: S$GLB,,,

## 2025-03-10 NOTE — PROGRESS NOTES
History & Physical    Subjective     History of Present Illness:  Patient is a 34 y.o. male who presents for evaluation of a bulge superior to his umbilicus.  He first noticed the bulge around a year ago, and feels it has slightly enlarged since then.  It was generally not painful, although he did have some discomfort in the area last week.  He is able to partially push it back in.  He denies any fevers, chills, nausea, vomiting, or changes in bowel function.  He denies any past abdominal surgical history.  He does not have any significant pulmonary cardiac history.      Of note, he does complain of a chronic intermittent cough since he was a child.  He reports never undergoing any significant workup for this.  He has never seen a pulmonologist or ENT.  He denies any significant shortness of breath or chest pain.  The cough is not productive.  Denies any associated wheezing.  Has been placed on a steroid inhaler in the past without improvement.  He has not noticed improvement in his cough with regular Nexium use.  He reports that his maternal grandmother has a history of emphysema secondary to alpha-1 antitrypsin deficiency. He is not a regular smoker.     Interval History:  Since the last clinic visit, the patient underwent open umbilical hernia repair with mesh.  He is doing well today with minimal remaining and improving soreness.  He requests a referral to pulmonology to further discuss his chronic cough.  He denies any fevers, chills, nausea, or vomiting.  He is tolerating a regular diet and having normal bowel movements.    Chief Complaint   Patient presents with    Post-op Evaluation     Status post open umbilical hernia repair       Review of patient's allergies indicates:  No Known Allergies    No current outpatient medications on file.     No current facility-administered medications for this visit.       Past Medical History:   Diagnosis Date    Umbilical hernia without obstruction and without gangrene  02/21/2025     Past Surgical History:   Procedure Laterality Date    REPAIR, HERNIA, VENTRAL N/A 2/21/2025    Procedure: REPAIR, HERNIA, VENTRAL;  Surgeon: Chi Maddox MD;  Location: [facility];  Service: General;  Laterality: N/A;    TENDON REPAIR Right     right wrist    WISDOM TOOTH EXTRACTION       Family History   Problem Relation Name Age of Onset    Thyroid disease Mother      Diabetes Brother       Social History     Tobacco Use    Smoking status: Never   Substance Use Topics    Alcohol use: Yes     Comment: rare    Drug use: Never        Review of Systems:  Review of Systems   Constitutional:  Negative for chills, fever and unexpected weight change.   HENT:  Negative for congestion.    Eyes:  Negative for visual disturbance.   Respiratory:  Positive for cough. Negative for shortness of breath.    Cardiovascular:  Negative for chest pain.   Gastrointestinal:  Negative for abdominal distention, abdominal pain, constipation, nausea, rectal pain and vomiting.   Genitourinary:  Negative for dysuria.   Musculoskeletal:  Negative for arthralgias.   Skin:  Negative for rash.   Neurological:  Negative for light-headedness.   Hematological:  Negative for adenopathy.          Objective     Vital Signs (Most Recent)        84.8 kg (187 lb)     Physical Exam:  Physical Exam  Vitals reviewed.   Constitutional:       General: He is not in acute distress.     Appearance: He is well-developed. He is not toxic-appearing.   HENT:      Head: Normocephalic and atraumatic.      Right Ear: External ear normal.      Left Ear: External ear normal.      Nose: Nose normal.      Mouth/Throat:      Mouth: Mucous membranes are moist.   Eyes:      Extraocular Movements: Extraocular movements intact.      Conjunctiva/sclera: Conjunctivae normal.   Cardiovascular:      Rate and Rhythm: Normal rate.   Pulmonary:      Effort: Pulmonary effort is normal. No respiratory distress.   Abdominal:      Comments: Soft, NT and ND.  Mild fullness  underlying site of previous hernia without signs of infection.  Incision is clean dry and intact.   Musculoskeletal:      Cervical back: Normal range of motion and neck supple.   Skin:     General: Skin is warm and dry.   Neurological:      Mental Status: He is alert and oriented to person, place, and time.   Psychiatric:         Behavior: Behavior normal.         Laboratory  Lab Results   Component Value Date    WBC 8.35 01/27/2025    HGB 17.5 01/27/2025    HCT 51.7 01/27/2025    MCV 89 01/27/2025     01/27/2025       CMP  Sodium   Date Value Ref Range Status   01/27/2025 139 136 - 145 mmol/L Final     Potassium   Date Value Ref Range Status   01/27/2025 4.3 3.5 - 5.1 mmol/L Final     Chloride   Date Value Ref Range Status   01/27/2025 104 95 - 110 mmol/L Final     CO2   Date Value Ref Range Status   01/27/2025 24 23 - 29 mmol/L Final     Glucose   Date Value Ref Range Status   01/27/2025 93 70 - 110 mg/dL Final     BUN   Date Value Ref Range Status   01/27/2025 14 6 - 20 mg/dL Final     Creatinine   Date Value Ref Range Status   01/27/2025 1.1 0.5 - 1.4 mg/dL Final     Calcium   Date Value Ref Range Status   01/27/2025 9.8 8.7 - 10.5 mg/dL Final     Total Protein   Date Value Ref Range Status   01/27/2025 7.9 6.0 - 8.4 g/dL Final     Albumin   Date Value Ref Range Status   01/27/2025 4.6 3.5 - 5.2 g/dL Final     Total Bilirubin   Date Value Ref Range Status   01/27/2025 0.4 0.1 - 1.0 mg/dL Final     Comment:     For infants and newborns, interpretation of results should be based  on gestational age, weight and in agreement with clinical  observations.    Premature Infant recommended reference ranges:  Up to 24 hours.............<8.0 mg/dL  Up to 48 hours............<12.0 mg/dL  3-5 days..................<15.0 mg/dL  6-29 days.................<15.0 mg/dL       Alkaline Phosphatase   Date Value Ref Range Status   01/27/2025 67 40 - 150 U/L Final     AST   Date Value Ref Range Status   01/27/2025 21 10 - 40  U/L Final     ALT   Date Value Ref Range Status   01/27/2025 40 10 - 44 U/L Final     Anion Gap   Date Value Ref Range Status   01/27/2025 11 8 - 16 mmol/L Final     eGFR   Date Value Ref Range Status   01/27/2025 >60.0 >60 mL/min/1.73 m^2 Final         Diagnostic Results:  U/S abdomen hernia  FINDINGS:  At the palpable area concern in the midline of the anterior abdominal wall superior to the umbilicus is a fat containing hernia with the herniated contents measuring 2.8 x 2.3 by 1.8 cm.  The mouth of the hernia measures 7 mm in diameter.     Impression:     Supraumbilical midline hernia containing only fat as detailed above.  No herniated bowel loops.       Assessment and Plan   34-year-old male with ventral hernia now status post open repair who is recovering as expected.    -advised continue light duty for a total of 6 weeks postoperatively to reduce the risk of hernia recurrence  - refer to pulmonology for discussion of chronic cough.  Not improved with previous antihistamines and PPI.  - return to clinic 1 month or as needed    Sherlyn Tsang PA-C  [facility]      I spent a total of 10 minutes of the day on this visit. This includes face to face time and non-face to face time preparing to see the patient (eg, review of tests), obtaining and/or reviewing separately obtained history, documenting clinical information in the electronic or other health record, independently interpreting results and communicating results to the patient/family/caregiver, or care coordinator.

## 2025-03-24 ENCOUNTER — HOSPITAL ENCOUNTER (OUTPATIENT)
Dept: RADIOLOGY | Facility: HOSPITAL | Age: 35
Discharge: HOME | End: 2025-03-24
Attending: INTERNAL MEDICINE
Payer: COMMERCIAL

## 2025-03-24 ENCOUNTER — CLINICAL SUPPORT (OUTPATIENT)
Dept: PULMONOLOGY | Facility: CLINIC | Age: 35
End: 2025-03-24
Payer: COMMERCIAL

## 2025-03-24 ENCOUNTER — RESULTS FOLLOW-UP (OUTPATIENT)
Dept: SLEEP MEDICINE | Facility: HOSPITAL | Age: 35
End: 2025-03-24

## 2025-03-24 ENCOUNTER — PATIENT MESSAGE (OUTPATIENT)
Dept: PULMONOLOGY | Facility: CLINIC | Age: 35
End: 2025-03-24

## 2025-03-24 ENCOUNTER — OFFICE VISIT (OUTPATIENT)
Dept: PULMONOLOGY | Facility: CLINIC | Age: 35
End: 2025-03-24
Payer: COMMERCIAL

## 2025-03-24 VITALS
HEIGHT: 70 IN | OXYGEN SATURATION: 98 % | DIASTOLIC BLOOD PRESSURE: 68 MMHG | RESPIRATION RATE: 20 BRPM | WEIGHT: 177.13 LBS | HEART RATE: 77 BPM | BODY MASS INDEX: 25.36 KG/M2 | SYSTOLIC BLOOD PRESSURE: 118 MMHG

## 2025-03-24 DIAGNOSIS — J45.991 COUGH VARIANT ASTHMA: ICD-10-CM

## 2025-03-24 DIAGNOSIS — R05.3 CHRONIC COUGH: Primary | ICD-10-CM

## 2025-03-24 DIAGNOSIS — R05.3 CHRONIC COUGH: ICD-10-CM

## 2025-03-24 DIAGNOSIS — Z87.19 HISTORY OF HERNIA REPAIR: ICD-10-CM

## 2025-03-24 DIAGNOSIS — Z83.49 FAMILY HISTORY OF ALPHA 1 ANTITRYPSIN DEFICIENCY: ICD-10-CM

## 2025-03-24 DIAGNOSIS — Z98.890 HISTORY OF HERNIA REPAIR: ICD-10-CM

## 2025-03-24 LAB
BRPFT: NORMAL
FEF 25 75 CHG: 1.7 %
FEF 25 75 LLN: 3.07
FEF 25 75 POST REF: 109.3 %
FEF 25 75 PRE REF: 107.4 %
FEF 25 75 REF: 4.78
FET100 CHG: 2.1 %
FEV1 CHG: 0.9 %
FEV1 FVC CHG: 0.4 %
FEV1 FVC LLN: 72
FEV1 FVC POST REF: 105 %
FEV1 FVC PRE REF: 104.6 %
FEV1 FVC REF: 82
FEV1 LLN: 3.53
FEV1 POST REF: 96.4 %
FEV1 PRE REF: 95.5 %
FEV1 REF: 4.44
FVC CHG: 0.5 %
FVC LLN: 4.35
FVC POST REF: 91.4 %
FVC PRE REF: 91 %
FVC REF: 5.42
PEF CHG: -2 %
PEF LLN: 7.95
PEF POST REF: 85.3 %
PEF PRE REF: 87 %
PEF REF: 10.28
POST FEF 25 75: 5.22 L/S (ref 3.07–6.49)
POST FET 100: 6.74 SEC
POST FEV1 FVC: 86.35 % (ref 71.52–93)
POST FEV1: 4.28 L (ref 3.53–5.34)
POST FVC: 4.95 L (ref 4.35–6.48)
POST PEF: 8.77 L/S (ref 7.95–12.6)
PRE FEF 25 75: 5.13 L/S (ref 3.07–6.49)
PRE FET 100: 6.61 SEC
PRE FEV1 FVC: 86.02 % (ref 71.52–93)
PRE FEV1: 4.24 L (ref 3.53–5.34)
PRE FVC: 4.93 L (ref 4.35–6.48)
PRE PEF: 8.94 L/S (ref 7.95–12.6)

## 2025-03-24 PROCEDURE — 71046 X-RAY EXAM CHEST 2 VIEWS: CPT | Mod: TC

## 2025-03-24 PROCEDURE — 99999 PR PBB SHADOW E&M-EST. PATIENT-LVL I: CPT | Mod: PBBFAC,,,

## 2025-03-24 PROCEDURE — 99999 PR PBB SHADOW E&M-EST. PATIENT-LVL IV: CPT | Mod: PBBFAC,,, | Performed by: INTERNAL MEDICINE

## 2025-03-24 PROCEDURE — 71046 X-RAY EXAM CHEST 2 VIEWS: CPT | Mod: 26,,, | Performed by: RADIOLOGY

## 2025-03-24 RX ORDER — ALBUTEROL SULFATE 90 UG/1
2 AEROSOL, METERED RESPIRATORY (INHALATION) EVERY 6 HOURS PRN
Qty: 18 G | Refills: 2 | Status: SHIPPED | OUTPATIENT
Start: 2025-03-24 | End: 2026-03-24

## 2025-03-24 NOTE — PROGRESS NOTES
"                                            Initial Outpatient Pulmonary Evaluation       SUBJECTIVE:     Chief Complaint   Patient presents with    Cough       History of Present Illness:    Patient is a 34 y.o. male presenting for chronic cough.      Patient reports years long history of dry cough with shortness of breath on exertion.      Has a grandmother with alpha-1 antitrypsin deficiency not sure if she was on treatment, now on nebulizers and oxygen and vest to clear secretions but she is a smoker.      Has a half brother with asthma.      Reports some relief with albuterol when used for exercise induced coughing.      Never smoker.          Review of Systems   Respiratory:  Positive for shortness of breath, dyspnea on extertion and use of rescue inhaler.        Review of patient's allergies indicates:  No Known Allergies    Current Medications[1]    Past Medical History:   Diagnosis Date    Umbilical hernia without obstruction and without gangrene 02/21/2025     Past Surgical History:   Procedure Laterality Date    REPAIR, HERNIA, VENTRAL N/A 2/21/2025    Procedure: REPAIR, HERNIA, VENTRAL;  Surgeon: Chi Maddox MD;  Location: [facility];  Service: General;  Laterality: N/A;    TENDON REPAIR Right     right wrist    WISDOM TOOTH EXTRACTION       Family History   Problem Relation Name Age of Onset    Thyroid disease Mother      Diabetes Brother       Social History[2]       OBJECTIVE:     Vital Signs (Most Recent)  Vital Signs  Pulse: 77  Resp: 20  SpO2: 98 %  BP: 118/68  Pain Score: 0-No pain  Height and Weight  Height: 5' 10" (177.8 cm)  Weight: 80.3 kg (177 lb 2.2 oz)  BSA (Calculated - sq m): 1.99 sq meters  BMI (Calculated): 25.4  Weight in (lb) to have BMI = 25: 173.9]  Wt Readings from Last 2 Encounters:   03/24/25 80.3 kg (177 lb 2.2 oz)   03/10/25 84.8 kg (187 lb)         Physical Exam:  Physical Exam   Constitutional: He appears well-developed and well-nourished.   Cardiovascular: Normal rate " SUBJECTIVE  Avni Engle is a 67 year old male who presents today complaining of developing spontaneously area of redness involving right posterior lower leg that started 3 days ago got progressively worse.  Patient denies any injury.  Patient suffers from pre diabetes with morbid obesity and history of paroxysmal atrial fibrillation now under control.  Patient denies any shortness of breath dizziness lightheadedness fevers or chills.  No other concerns.    Current Outpatient Medications   Medication Sig   • meloxicam (MOBIC) 15 MG tablet Take 1 tablet by mouth daily.   • cefdinir (OMNICEF) 300 MG capsule Take 1 capsule by mouth in the morning and 1 capsule in the evening.   • dilTIAZem (CARDIZEM CD) 120 MG 24 hr capsule Take 1 capsule by mouth nightly.   • venlafaxine XR (EFFEXOR XR) 37.5 MG 24 hr capsule Take 1 capsule by mouth daily.   • venlafaxine XR (EFFEXOR XR) 75 MG 24 hr capsule Take 1 capsule by mouth daily.   • ezetimibe (ZETIA) 10 MG tablet Take 1 tablet by mouth daily.   • sucralfate (CARAFATE) 1 g tablet Take 1 tablet by mouth 4 times daily as needed (burning sensation).   • LORazepam (ATIVAN) 0.5 MG tablet TAKE 1 TABLET BY MOUTH DAILY AS NEEDED FOR ANXIETY   • tiZANidine (ZANAFLEX) 4 MG tablet TAKE 1 TABLET BY MOUTH AT BEDTIME AS NEEDED FOR BACK PAIN   • diclofenac (VOLTAREN) 1 % gel Apply 2 g topically 2 times daily as needed (pain).   • flecainide (TAMBOCOR) 100 MG tablet FOR ATRIAL FIBRILLATION EPISODES GREATER THAN 1 HOUR DO NOT EXCEED 300 MG IN A 24 HOURS PERIOD   • traMADol (ULTRAM) 50 MG tablet Take 1 tablet by mouth every 6 hours as needed for Pain.   • famotidine (PEPCID) 20 MG tablet Take 1 tablet by mouth 2 times daily.   • fluticasone (Flonase) 50 MCG/ACT nasal spray Spray 2 sprays in each nostril daily.   • Nystop 284735 UNIT/GM powder APPLY TO THE AFFECTED AREA THREE TIMES DAILY   • Magnesium 200 MG Tab Take 400 mg by mouth daily.   • meclizine (ANTIVERT) 25 MG tablet Take 1  "and regular rhythm.   Pulmonary/Chest: Normal expansion and effort normal. No respiratory distress. He has no wheezes. He has no rhonchi.   Psychiatric: His behavior is normal.       Laboratory  Lab Results   Component Value Date    WBC 8.35 2025    RBC 5.84 2025    HGB 17.5 2025    HCT 51.7 2025    MCV 89 2025    MCH 30.0 2025    MCHC 33.8 2025    RDW 12.5 2025     2025    MPV 10.1 2025    GRAN 5.0 2025    GRAN 59.5 2025    LYMPH 2.5 2025    LYMPH 29.6 2025    MONO 0.8 2025    MONO 9.0 2025    EOS 0.1 2025    BASO 0.04 2025    EOSINOPHIL 0.8 2025    BASOPHIL 0.5 2025       BMP  Lab Results   Component Value Date     2025    K 4.3 2025     2025    CO2 24 2025    BUN 14 2025    CREATININE 1.1 2025    CALCIUM 9.8 2025    ANIONGAP 11 2025    AST 21 2025    ALT 40 2025    PROT 7.9 2025       No results found for: "BNP"    Lab Results   Component Value Date    TSH 1.603 2024       No results found for: "SEDRATE"    No results found for: "CRP"    No results found for: "IGE"    No results found for: "ASPERGILLUS"  No results found for: "AFUMIGATUSCL"     No results found for: "ACE"    Diagnostic Results:  I have personally reviewed today the following studies :        ASSESSMENT/PLAN:     Chronic cough  -     Ambulatory referral/consult to Pulmonology  -     Spirometry with/without bronchodilator; Future  -     Fraction of  Nitric Oxide; Future; Expected date: 2025  -     albuterol (VENTOLIN HFA) 90 mcg/actuation inhaler; Inhale 2 puffs into the lungs every 6 (six) hours as needed for Wheezing or Shortness of Breath (cough). Rescue  Dispense: 18 g; Refill: 2    Cough variant asthma  -     Spirometry with/without bronchodilator; Future  -     Fraction of  Nitric Oxide; Future; Expected date: " tablet by mouth 3 times daily as needed for Dizziness.   • Omega-3 Fatty Acids (FISH OIL PO)    • cholecalciferol (VITAMIN D3) 1000 UNITS tablet Take 5,000 Units by mouth daily.    • POTASSIUM CITRATE ER PO Take 99 mg by mouth daily.   • beta carotene (VITAMIN A) 25245 UNIT capsule Take 25,000 Units by mouth daily.   • ferrous sulfate 325 (65 FE) MG tablet Take 325 mg by mouth daily (with breakfast).   • Ascorbic Acid (VITAMIN C) 1000 MG tablet Take 500 mg by mouth daily.    • Multiple Vitamins-Minerals (MULTIVITAMIN PO) Take 1 tablet by mouth 3 times daily.      No current facility-administered medications for this visit.        Allergies as of 09/26/2022 - Reviewed 09/26/2022   Allergen Reaction Noted   • Metoprolol SHORTNESS OF BREATH 01/25/2016   • Pantoprazole GI UPSET 08/11/2015   • Statins MYALGIA 07/27/2015       REVIEW OF SYSTEMS  Review of systems is negative for all other systems.    PAST SURGICAL HISTORY, SOCIAL HISTORY, FAMILY HISTORY, PAST MEDICAL HISTORY  Reviewed on file.     PHYSICAL EXAMINATION  General:  He is morbidly obese male, in no acute distress.  Blood pressure 102/64, pulse 68, temperature 98 °F (36.7 °C), resp. rate 12, height 5' 11\" (1.803 m), weight (!) 157.4 kg (347 lb), SpO2 98 %.  HEENT:  Pupils equal, reactive to light and accommodation.  Sclerae anicteric.  Nose is  clear.  Throat is clear.  Neck:  Supple.  No thyromegaly, no lymphadenopathy, no JVD, no carotid bruit.  Lungs:  Essentially clear to auscultation.  Coronary:  Normal regular heart rate and rhythm without murmur or arrhythmia.    Abdomen:  Benign.  No hepatosplenomegaly.  No mass, rebound, guarding or CVA tenderness.  Extremities:  No cyanosis, clubbing with bilateral trace of edema, evaluation of posterior aspect of the right leg reveals area of erythema but no drainage no induration warm to touch.  Homans test is negative bilaterally.  Pulses strong, symmetrical in dorsalis pedis and posterior tibialis.  Sensory to  2025  -     X-Ray Chest PA And Lateral; Future; Expected date: 2025  -     albuterol (VENTOLIN HFA) 90 mcg/actuation inhaler; Inhale 2 puffs into the lungs every 6 (six) hours as needed for Wheezing or Shortness of Breath (cough). Rescue  Dispense: 18 g; Refill: 2  -     IgE; Future; Expected date: 2025    Family history of alpha 1 antitrypsin deficiency  -     Alpha-1-Antitrypsin; Future; Expected date: 2025    History of hernia repair        Albuterol p.r.n.     Check spirometry fraction  nitric oxide check chest x-ray alpha-1 antitrypsin level.      Additional recommendation to follow above workup.  Follow up in about 6 weeks (around 2025).    This note was prepared using voice recognition system and is likely to have sound alike errors that may have been overlooked even after proof reading.  Please call me with any questions    Discussed diagnosis, its evaluation, treatment and usual course. All questions answered.    Thank you for the courtesy of participating in the care of this patient    Adenike Pichardo MD               [1]   Current Outpatient Medications   Medication Sig Dispense Refill    albuterol (VENTOLIN HFA) 90 mcg/actuation inhaler Inhale 2 puffs into the lungs every 6 (six) hours as needed for Wheezing or Shortness of Breath (cough). Rescue 18 g 2     No current facility-administered medications for this visit.   [2]   Social History  Tobacco Use    Smoking status: Never   Substance Use Topics    Alcohol use: Yes     Comment: rare    Drug use: Never      light touch, position and vibration is intact.  Neurologic:  Finger-to-nose, Romberg test normal.  Cranial nerves 2-12 intact.     LABS  Lab Services on 09/06/2022   Component Date Value Ref Range Status   • Fasting Status 09/06/2022 12  0 - 999 Hours Final   • Cholesterol 09/06/2022 254 (A) <=199 mg/dL Final    Desirable         <200  Borderline High   200 to 239  High              >=240   • Triglycerides 09/06/2022 89  <=149 mg/dL Final    Normal            <150  Borderline High   150 to 199  High              200 to 499  Very High         >=500   • HDL 09/06/2022 56  >=40 mg/dL Final    Low              <40  Borderline Low   40 to 49  Near Optimal     50 to 59  Optimal          >=60   • LDL 09/06/2022 180 (A) <=129 mg/dL Final    OPTIMAL           <100  NEAR OPTIMAL      100 to 129  BORDERLINE HIGH   130 to 159  HIGH              160 to 189  VERY HIGH         >=190   • Non-HDL Cholesterol 09/06/2022 198  mg/dL Final    Therapeutic Target:  CHD and risk equivalents  <130  Multiple risk factors     <160  0 to 1 risk factor        <190   • Cholesterol/ HDL Ratio 09/06/2022 4.5 (A) <=4.4 Final   • Hemoglobin A1C 09/06/2022 5.6  4.5 - 5.6 % Final      Diabetic Screening  Non Diabetic:             <5.7%  Increased Risk:           5.7-6.4%  Diagnostic For Diabetes:  >6.4%    Diabetic Control  A1C%       eAG mg/dL  6.0            126  6.5            140  7.0            154  7.5            169  8.0            183  8.5            197  9.0            212  9.5            226  10.0           240   • Fasting Status 09/06/2022 12  0 - 999 Hours Final   • Sodium 09/06/2022 138  135 - 145 mmol/L Final   • Potassium 09/06/2022 4.8  3.4 - 5.1 mmol/L Final   • Chloride 09/06/2022 102  97 - 110 mmol/L Final   • Carbon Dioxide 09/06/2022 29  21 - 32 mmol/L Final   • Anion Gap 09/06/2022 12  7 - 19 mmol/L Final   • Glucose 09/06/2022 105 (A) 70 - 99 mg/dL Final   • BUN 09/06/2022 17  6 - 20 mg/dL Final   • Creatinine 09/06/2022 0.66  (A) 0.67 - 1.17 mg/dL Final   • Glomerular Filtration Rate 09/06/2022 >90  >=60 Final    eGFR results = or >60 mL/min/1.73m2 = Normal kidney function. Estimated GFR calculated using the CKD-EPI-R (2021) equation that does not include race in the creatinine calculation.   • BUN/ Creatinine Ratio 09/06/2022 26 (A) 7 - 25 Final   • Calcium 09/06/2022 9.3  8.4 - 10.2 mg/dL Final   • Prostate Specific Antigen 09/06/2022 1.22  <=4.50 ng/mL Final    [employer] Chemiluminescence. Results obtained with different assay methods or kits cannot be used interchangeably.     Lab Services on 07/07/2022   Component Date Value Ref Range Status   • Magnesium 07/07/2022 2.4  1.7 - 2.4 mg/dL Final   • Sodium 07/07/2022 142  135 - 145 mmol/L Final   • Potassium 07/07/2022 4.0  3.4 - 5.1 mmol/L Final   • Chloride 07/07/2022 104  97 - 110 mmol/L Final   • Carbon Dioxide 07/07/2022 29  21 - 32 mmol/L Final   • Anion Gap 07/07/2022 13  7 - 19 mmol/L Final   • Glucose 07/07/2022 94  70 - 99 mg/dL Final   • BUN 07/07/2022 15  6 - 20 mg/dL Final   • Creatinine 07/07/2022 0.53 (A) 0.67 - 1.17 mg/dL Final   • Glomerular Filtration Rate 07/07/2022 >90  >=60 Final    eGFR results = or >60 mL/min/1.73m2 = Normal kidney function. Estimated GFR calculated using the CKD-EPI-R (2021) equation that does not include race in the creatinine calculation.   • BUN/ Creatinine Ratio 07/07/2022 28 (A) 7 - 25 Final   • Calcium 07/07/2022 8.9  8.4 - 10.2 mg/dL Final   Admission on 07/05/2022, Discharged on 07/05/2022   Component Date Value Ref Range Status   • Ventricular Rate EKG/Min (BPM) 07/05/2022 70   Final   • Atrial Rate (BPM) 07/05/2022 70   Final   • NE-Interval (MSEC) 07/05/2022 180   Final   • QRS-Interval (MSEC) 07/05/2022 82   Final   • QT-Interval (MSEC) 07/05/2022 392   Final   • QTc 07/05/2022 423   Final   • P Axis (Degrees) 07/05/2022 78   Final   • R Axis (Degrees) 07/05/2022 72   Final   • T Axis (Degrees) 07/05/2022 59   Final   • REPORT  TEXT 07/05/2022    Final                    Value:Normal sinus rhythm  with sinus arrhythmia  Low voltage QRS  Borderline ECG  When compared with ECG of  15-JUL-2021 13:52,  No significant change was found  Confirmed by MD KIKO, MARISA NICHOLS ([id number]),  Jeronimo Heredia ([id number]) on 7/5/2022 8:28:15 AM     • Sodium 07/05/2022 140  135 - 145 mmol/L Final   • Potassium 07/05/2022 3.8  3.4 - 5.1 mmol/L Final   • Chloride 07/05/2022 107  97 - 110 mmol/L Final   • Carbon Dioxide 07/05/2022 27  21 - 32 mmol/L Final   • Anion Gap 07/05/2022 10  7 - 19 mmol/L Final   • Glucose 07/05/2022 104 (A) 70 - 99 mg/dL Final   • BUN 07/05/2022 17  6 - 20 mg/dL Final   • Creatinine 07/05/2022 0.60 (A) 0.67 - 1.17 mg/dL Final   • Glomerular Filtration Rate 07/05/2022 >90  >=60 Final    eGFR results = or >60 mL/min/1.73m2 = Normal kidney function. Estimated GFR calculated using the CKD-EPI-R (2021) equation that does not include race in the creatinine calculation.   • BUN/ Creatinine Ratio 07/05/2022 28 (A) 7 - 25 Final   • Calcium 07/05/2022 8.8  8.4 - 10.2 mg/dL Final   • Bilirubin, Total 07/05/2022 0.6  0.2 - 1.0 mg/dL Final   • GOT/AST 07/05/2022 13  <=37 Units/L Final   • GPT/ALT 07/05/2022 17  <64 Units/L Final   • Alkaline Phosphatase 07/05/2022 75  45 - 117 Units/L Final   • Albumin 07/05/2022 3.4 (A) 3.6 - 5.1 g/dL Final   • Protein, Total 07/05/2022 6.9  6.4 - 8.2 g/dL Final   • Globulin 07/05/2022 3.5  2.0 - 4.0 g/dL Final   • A/G Ratio 07/05/2022 1.0  1.0 - 2.4 Final   • WBC 07/05/2022 6.8  4.2 - 11.0 K/mcL Final   • RBC 07/05/2022 4.64  4.50 - 5.90 mil/mcL Final   • HGB 07/05/2022 14.6  13.0 - 17.0 g/dL Final   • HCT 07/05/2022 43.1  39.0 - 51.0 % Final   • MCV 07/05/2022 92.9  78.0 - 100.0 fl Final   • MCH 07/05/2022 31.5  26.0 - 34.0 pg Final   • MCHC 07/05/2022 33.9  32.0 - 36.5 g/dL Final   • RDW-CV 07/05/2022 15.0  11.0 - 15.0 % Final   • RDW-SD 07/05/2022 51.3 (A) 39.0 - 50.0 fL Final   • PLT 07/05/2022 203  140 -  450 K/mcL Final   • NRBC 07/05/2022 0  <=0 /100 WBC Final   • Neutrophil, Percent 07/05/2022 67  % Final   • Lymphocytes, Percent 07/05/2022 21  % Final   • Mono, Percent 07/05/2022 7  % Final   • Eosinophils, Percent 07/05/2022 5  % Final   • Basophils, Percent 07/05/2022 0  % Final   • Immature Granulocytes 07/05/2022 0  % Final   • Absolute Neutrophils 07/05/2022 4.5  1.8 - 7.7 K/mcL Final   • Absolute Lymphocytes 07/05/2022 1.4  1.0 - 4.0 K/mcL Final   • Absolute Monocytes 07/05/2022 0.5  0.3 - 0.9 K/mcL Final   • Absolute Eosinophils  07/05/2022 0.4  0.0 - 0.5 K/mcL Final   • Absolute Basophils 07/05/2022 0.0  0.0 - 0.3 K/mcL Final   • Absolute Immmature Granulocytes 07/05/2022 0.0  0.0 - 0.2 K/mcL Final   • Extra Tube 07/05/2022 Hold for Add Ons   Final   • Extra Tube 07/05/2022 Hold for Add Ons   Final   • BUN - POINT OF CARE 07/05/2022 17  6 - 20 mg/dL Final   • SODIUM - POINT OF CARE 07/05/2022 139  135 - 145 mmol/L Final   • POTASSIUM - POINT OF CARE 07/05/2022 3.8  3.4 - 5.1 mmol/L Final   • CHLORIDE - POINT OF CARE 07/05/2022 102  97 - 110 mmol/L Final   • TCO2 - POINT OF CARE 07/05/2022 25 (A) 19 - 24 mmol/L Final   • ANION GAP - POINT OF CARE 07/05/2022 17  7 - 19 mmol/L Final   • HEMATOCRIT - POINT OF CARE 07/05/2022 42.0  39.0 - 51.0 % Final   • HEMOGLOBIN - POINT OF CARE 07/05/2022 14.3  13.0 - 17.0 g/dL Final   • GLUCOSE - POINT OF CARE 07/05/2022 107 (A) 70 - 99 mg/dL Final   • CALCIUM, IONIZED - POINT OF CARE 07/05/2022 1.19  1.15 - 1.29 mmol/L Final   • Creatinine 07/05/2022 0.50 (A) 0.67 - 1.17 mg/dL Final   • Glomerular Filtration Rate 07/05/2022 >90  >=60 Final    eGFR results = or >60 mL/min/1.73m2 = Normal kidney function. Estimated GFR calculated using the CKD-EPI-R (2021) equation that does not include race in the creatinine calculation.   • TROPONIN I - POINT OF CARE 07/05/2022 <0.10  <0.10 ng/mL Final   Lab Services on 05/31/2022   Component Date Value Ref Range Status   • Fasting  Status 05/31/2022 12  0 - 999 Hours Final   • Cholesterol 05/31/2022 207 (A) <=199 mg/dL Final    Desirable         <200  Borderline High   200 to 239  High              >=240   • Triglycerides 05/31/2022 109  <=149 mg/dL Final    Normal            <150  Borderline High   150 to 199  High              200 to 499  Very High         >=500   • HDL 05/31/2022 46  >=40 mg/dL Final    Low              <40  Borderline Low   40 to 49  Near Optimal     50 to 59  Optimal          >=60   • LDL 05/31/2022 139 (A) <=129 mg/dL Final    OPTIMAL           <100  NEAR OPTIMAL      100 to 129  BORDERLINE HIGH   130 to 159  HIGH              160 to 189  VERY HIGH         >=190   • Non-HDL Cholesterol 05/31/2022 161  mg/dL Final    Therapeutic Target:  CHD and risk equivalents  <130  Multiple risk factors     <160  0 to 1 risk factor        <190   • Cholesterol/ HDL Ratio 05/31/2022 4.5 (A) <=4.4 Final   • Albumin 05/31/2022 3.5 (A) 3.6 - 5.1 g/dL Final   • Bilirubin, Total 05/31/2022 0.7  0.2 - 1.0 mg/dL Final   • Bilirubin, Direct 05/31/2022 0.2  0.0 - 0.2 mg/dL Final   • Alkaline Phosphatase 05/31/2022 84  45 - 117 Units/L Final   • GPT/ALT 05/31/2022 30  <64 Units/L Final   • GOT/AST 05/31/2022 20  <=37 Units/L Final   • Protein, Total 05/31/2022 6.8  6.4 - 8.2 g/dL Final   Lab Services on 05/03/2022   Component Date Value Ref Range Status   • Fasting Status 05/03/2022 14  0 - 999 Hours Final   • Sodium 05/03/2022 137  135 - 145 mmol/L Final   • Potassium 05/03/2022 5.1  3.4 - 5.1 mmol/L Final   • Chloride 05/03/2022 104  98 - 107 mmol/L Final   • Carbon Dioxide 05/03/2022 30  21 - 32 mmol/L Final   • Anion Gap 05/03/2022 8 (A) 10 - 20 mmol/L Final   • Glucose 05/03/2022 89  70 - 99 mg/dL Final   • BUN 05/03/2022 13  6 - 20 mg/dL Final   • Creatinine 05/03/2022 0.71  0.67 - 1.17 mg/dL Final   • Glomerular Filtration Rate 05/03/2022 >90  >=60 Final    eGFR results = or >60 mL/min/1.73m2 = Normal kidney function. Estimated GFR  calculated using the CKD-EPI-R (2021) equation that does not include race in the creatinine calculation.   • BUN/ Creatinine Ratio 05/03/2022 18  7 - 25 Final   • Calcium 05/03/2022 9.5  8.4 - 10.2 mg/dL Final   • Bilirubin, Total 05/03/2022 0.5  0.2 - 1.0 mg/dL Final   • GOT/AST 05/03/2022 21  <=37 Units/L Final   • GPT/ALT 05/03/2022 33  <64 Units/L Final   • Alkaline Phosphatase 05/03/2022 87  45 - 117 Units/L Final   • Albumin 05/03/2022 3.8  3.6 - 5.1 g/dL Final   • Protein, Total 05/03/2022 7.3  6.4 - 8.2 g/dL Final   • Globulin 05/03/2022 3.5  2.0 - 4.0 g/dL Final   • A/G Ratio 05/03/2022 1.1  1.0 - 2.4 Final   • TSH 05/03/2022 1.134  0.350 - 5.000 mcUnits/mL Final    Findings most consistent with euthyroid state, no additional testing suggested. TSH may be normal in patients with thyroid dysfunction and pituitary disease. Clinical correlation recommended.    (Reflex TSH algorithm is not recommended in hospitalized patients. A variety of drugs, as well as serious acute and chronic illnesses may alter thyroid function tests. Commonly implicated drugs include glucocorticoids, dopamine, carbamazepine, iodine, amiodarone, lithium and heparin.)   • Fasting Status 05/03/2022 14  0 - 999 Hours Final   • Cholesterol 05/03/2022 222 (A) <=199 mg/dL Final    Desirable         <200  Borderline High   200 to 239  High              >=240   • Triglycerides 05/03/2022 93  <=149 mg/dL Final    Normal            <150  Borderline High   150 to 199  High              200 to 499  Very High         >=500   • HDL 05/03/2022 53  >=40 mg/dL Final    Low              <40  Borderline Low   40 to 49  Near Optimal     50 to 59  Optimal          >=60   • LDL 05/03/2022 150 (A) <=129 mg/dL Final    OPTIMAL           <100  NEAR OPTIMAL      100 to 129  BORDERLINE HIGH   130 to 159  HIGH              160 to 189  VERY HIGH         >=190   • Non-HDL Cholesterol 05/03/2022 169  mg/dL Final    Therapeutic Target:  CHD and risk equivalents   <130  Multiple risk factors     <160  0 to 1 risk factor        <190   • Cholesterol/ HDL Ratio 05/03/2022 4.2  <=4.4 Final   • Hemoglobin A1C 05/03/2022 5.6  4.5 - 5.6 % Final      Diabetic Screening  Non Diabetic:             <5.7%  Increased Risk:           5.7-6.4%  Diagnostic For Diabetes:  >6.4%    Diabetic Control  A1C%       eAG mg/dL  6.0            126  6.5            140  7.0            154  7.5            169  8.0            183  8.5            197  9.0            212  9.5            226  10.0           240   • Vitamin D, 25-Hydroxy 05/03/2022 48.0  30.0 - 100.0 ng/mL Final    <20 ng/mL  =  Vitamin D deficiency  20-29 ng/mL  =  Vitamin D insufficiency   ng/mL  =  Optimal Vitamin D  >150 ng/mL  =  Possible toxicity   • Iron 05/03/2022 72  65 - 175 mcg/dL Final   • Iron Binding Capacity 05/03/2022 340  250 - 450 mcg/dL Final   • Iron, Percent Saturation 05/03/2022 21  15 - 45 % Final   • Ferritin 05/03/2022 178  26 - 388 ng/mL Final   • WBC 05/03/2022 6.9  4.2 - 11.0 K/mcL Final   • RBC 05/03/2022 5.06  4.50 - 5.90 mil/mcL Final   • HGB 05/03/2022 15.4  13.0 - 17.0 g/dL Final   • HCT 05/03/2022 45.7  39.0 - 51.0 % Final   • MCV 05/03/2022 90.3  78.0 - 100.0 fl Final   • MCH 05/03/2022 30.4  26.0 - 34.0 pg Final   • MCHC 05/03/2022 33.7  32.0 - 36.5 g/dL Final   • RDW-CV 05/03/2022 15.6 (A) 11.0 - 15.0 % Final   • RDW-SD 05/03/2022 52.4 (A) 39.0 - 50.0 fL Final   • PLT 05/03/2022 234  140 - 450 K/mcL Final   • Neutrophil, Percent 05/03/2022 63  % Final   • Lymphocytes, Percent 05/03/2022 24  % Final   • Mono, Percent 05/03/2022 7  % Final   • Eosinophils, Percent 05/03/2022 5  % Final   • Basophils, Percent 05/03/2022 1  % Final   • Immature Granulocytes 05/03/2022 0  % Final   • Absolute Neutrophils 05/03/2022 4.4  1.8 - 7.7 K/mcL Final   • Absolute Lymphocytes 05/03/2022 1.7  1.0 - 4.0 K/mcL Final   • Absolute Monocytes 05/03/2022 0.5  0.3 - 0.9 K/mcL Final   • Absolute Eosinophils  05/03/2022 0.3   0.0 - 0.5 K/mcL Final   • Absolute Basophils 05/03/2022 0.1  0.0 - 0.3 K/mcL Final   • Absolute Immmature Granulocytes 05/03/2022 0.0  0.0 - 0.2 K/mcL Final       ASSESSMENT AND PLAN  1. Cellulitis of right leg    2. Morbid obesity with BMI of 45.0-49.9, adult (CMS/[facility])    3. Pre-diabetes        Orders Placed This Encounter   • meloxicam (MOBIC) 15 MG tablet   • cefdinir (OMNICEF) 300 MG capsule       Wear elastic stockings daily.  Apply locally ice twice a day.  Start Omnicef twice a day for 10 days and meloxicam daily continue with aspirin daily follow-up with PMD in 2 weeks for recheck, sooner if any problem.

## 2025-03-24 NOTE — PROCEDURES
Clinical Guide to Interpretation or FeNO Levels :    FeNO  (ppb) LOW INTERMEDIATE HIGH   ADULT VALUES < 25 25-50          > 50   Th2-driven Inflammation Unlikely Likely Significant     Patients FeNO level at this visit : __16__ (ppb)    Interpretation of FeNO measurement in adults:  [x] FENO is less than 25 ppb implies non eosinophilic airway inflammation or the absence of airway inflammation.    Comment: Low FENO (<25 ppb) in adult asthmatics with persistent symptoms suggests other etiologies for these symptoms and a lower likelihood of benefit from adding or increasing inhaled glucocorticoids.    [] FENO between 25 and 50 ppb in adults should be interpreted cautiously with reference to the clinical situation (eg, symptomatic, on or off therapy, current smoking).    [] FENO greater than 50 ppb in adults  suggests eosinophilic airway inflammation   Comment: High FENO (>50 ppb) in adult asthmatics even with atypical symptoms suggests glucocorticoid responsiveness. High FENO (>50 ppb) can help identify poor adherence or uncontrolled inflammation in asthma patients with otherwise seemingly controlled asthma.    Discussion:  A FENO less than 25 ppb in adults and less than 20 ppb in children younger than 12 years of age implies noneosinophilic airway inflammation or the absence of airway inflammation.  A FENO greater than 50 ppb in adults or greater than 35 ppb in children suggests eosinophilic airway inflammation.   Values of FENO between 25 and 50 ppb in adults (20 to 35 ppb in children) should be interpreted cautiously with reference to the clinical situation (eg, symptomatic, on or off therapy, current smoking).  A rising FENO with a greater than 20 percent change and more than 25 ppb (20 ppb in children) from a previously stable level suggests increasing eosinophilic airway inflammation, but there are wide inter-individual differences.  A decrease in FENO greater than 20 percent for values over 50 ppb or more than  10 ppb for values less than 50 ppb may be clinically important.  ?FENO in other respiratory diseases - Several other diseases are associated with altered levels of exhaled NO: low levels of FENO have been noted in cystic fibrosis, current smoking, pulmonary hypertension, hypothermia, primary ciliary dyskinesia, and bronchopulmonary dysplasia. Elevated FENO has been noted in atopy, nonasthmatic eosinophilic bronchitis, COPD exacerbations, noncystic fibrosis bronchiectasis, and viral upper respiratory infections.    REFERENCE:  ATS Board of Directors, December 2004, and by the ERS Executive Committee, June 2004. ATS/ERS Recommendations for Standardized Procedures for the Online and Offline Measurement of Exhaled Lower Respiratory Nitric Oxide and Nasal Nitric Oxide. Guideline 2005

## (undated) DEVICE — ELECTRODE REM PLYHSV RETURN 9

## (undated) DEVICE — APPLICATOR CHLORAPREP ORN 26ML

## (undated) DEVICE — SYR 10CC LUER LOCK

## (undated) DEVICE — COVER LIGHT HANDLE 80/CA

## (undated) DEVICE — SUT PDS II O CT-2 VIL MONO

## (undated) DEVICE — GAUZE WOVEN STRL 12-PLY 4X4IN

## (undated) DEVICE — SUT MONOCYRL 4-0 PS2 UND

## (undated) DEVICE — SOL NACL IRR 1000ML BTL

## (undated) DEVICE — TOWEL OR DISP STRL BLUE 4/PK

## (undated) DEVICE — DRAPE LAP T SHT W/ INSTR PAD

## (undated) DEVICE — SUT VICRYL PLUS 2-0 18IN

## (undated) DEVICE — MANIFOLD 4 PORT

## (undated) DEVICE — NDL ECLIPSE SAF REG 25GX1.5IN

## (undated) DEVICE — GOWN POLY REINF BRTH SLV XL

## (undated) DEVICE — SUT VICRYL 3-0 27 SH

## (undated) DEVICE — SUT VICRYL 0 SH

## (undated) DEVICE — SPONGE COTTON TRAY 4X4IN

## (undated) DEVICE — PACK BASIC SETUP SC BR